# Patient Record
Sex: MALE | Race: WHITE | Employment: PART TIME | URBAN - METROPOLITAN AREA
[De-identification: names, ages, dates, MRNs, and addresses within clinical notes are randomized per-mention and may not be internally consistent; named-entity substitution may affect disease eponyms.]

---
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
## 2021-03-11 NOTE — PROGRESS NOTES
Megha Feliciano   2000, 24 y.o. male   <[id number]>       Referring Provider: Desi Martinez MD  Referral Type: In an order in [address] [place] [address]    Reason for Visit: Evaluation of suspected change in hearing, tinnitus, or balance. ADULT AUDIOLOGIC EVALUATION      Megha Feliciano is a 24 y.o. male seen today, 3/17/2021 for an initial audiologic evaluation. 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 Megha Feliciano denied otalgia, aural fullness, otorrhea, significant dizziness, history of head trauma, history of ear surgery, and family history of hearing loss. IMPRESSIONS:       Today's results are consistent with hearing sensitivity within normal limits with normal middle ear function and excellent word recognition for soft conversational speech bilaterally. Discussed tinnitus management strategies; follow medical recommendations from Dr. Rich Neff. ASSESSMENT AND FINDINGS:       Otoscopy revealed: Clear ear canals bilaterally      RIGHT EAR:  Hearing Sensitivity: Within normal limits. Speech Recognition Threshold: 5 dB HL  Word Recognition: Excellent (100%), based on NU-6 25-word list at 45 dBHL using recorded speech stimuli. Tympanometry: Normal peak pressure and compliance, Type A tympanogram, consistent with normal middle ear function. LEFT EAR:  Hearing Sensitivity: Within normal limits. Speech Recognition Threshold: 5 dB HL  Word Recognition: Excellent (100%), based on NU-6 25-word list at 45 dBHL using recorded speech stimuli. Tympanometry: Normal peak pressure and compliance, Type A tympanogram, consistent with normal middle ear function. Reliability: Good  Transducer:  Inserts    See scanned audiogram dated 3/17/2021 for results. PATIENT EDUCATION:       The following items were discussed with the patient:    - Good Communication Strategies   - Tinnitus Management Strategies    - Noise-Induced Hearing Loss and use of Hearing Protection Devices (HPDs)    Educational information was shared in the After Visit Summary. RECOMMENDATIONS:                                                                                                                                                                                                                                                                      The following items are recommended based on patient report and results from today's appointment:   - Continue medical follow-up with Basil Phillips MD.   - Retest hearing as medically indicated and/or sooner if a change in hearing is noted. - Utilize \"Good Communication Strategies\" as discussed to assist in speech understanding with communication partners. - Maintain a sound enriched environment to assist in the management of tinnitus symptoms.      - Use hearing protection devices (HPDs), such as protective ear muffs and ear plugs, when exposed to dangerous sound levels. [facility] [place], [place]  Audiologist      Chart CC'd to:  Basil Phillips MD      Degree of   Hearing Sensitivity dB Range   Within Normal Limits (WNL) 0 - 20   Mild 20 - 40   Moderate 40 - 55   Moderately-Severe 55 - 70   Severe 70 - 90   Profound 90 +

## 2021-03-17 ENCOUNTER — PROCEDURE VISIT (OUTPATIENT)
Dept: AUDIOLOGY | Age: 21
End: 2021-03-17
Payer: COMMERCIAL

## 2021-03-17 ENCOUNTER — OFFICE VISIT (OUTPATIENT)
Dept: ENT CLINIC | Age: 21
End: 2021-03-17
Payer: COMMERCIAL

## 2021-03-17 VITALS
SYSTOLIC BLOOD PRESSURE: 121 MMHG | WEIGHT: 166.2 LBS | HEART RATE: 72 BPM | HEIGHT: 71 IN | BODY MASS INDEX: 23.27 KG/M2 | TEMPERATURE: 97.2 F | DIASTOLIC BLOOD PRESSURE: 69 MMHG

## 2021-03-17 DIAGNOSIS — Z01.10 ENCOUNTER FOR EXAMINATION OF EARS AND HEARING WITHOUT ABNORMAL FINDINGS: ICD-10-CM

## 2021-03-17 DIAGNOSIS — H93.13 TINNITUS, BILATERAL: Primary | ICD-10-CM

## 2021-03-17 PROCEDURE — 92567 TYMPANOMETRY: CPT | Performed by: AUDIOLOGIST

## 2021-03-17 PROCEDURE — 92552 PURE TONE AUDIOMETRY AIR: CPT | Performed by: AUDIOLOGIST

## 2021-03-17 PROCEDURE — G8484 FLU IMMUNIZE NO ADMIN: HCPCS | Performed by: OTOLARYNGOLOGY

## 2021-03-17 PROCEDURE — G8427 DOCREV CUR MEDS BY ELIG CLIN: HCPCS | Performed by: OTOLARYNGOLOGY

## 2021-03-17 PROCEDURE — 99203 OFFICE O/P NEW LOW 30 MIN: CPT | Performed by: OTOLARYNGOLOGY

## 2021-03-17 PROCEDURE — 92556 SPEECH AUDIOMETRY COMPLETE: CPT | Performed by: AUDIOLOGIST

## 2021-03-17 PROCEDURE — G8420 CALC BMI NORM PARAMETERS: HCPCS | Performed by: OTOLARYNGOLOGY

## 2021-03-17 NOTE — PATIENT INSTRUCTIONS
Tinnitus: Overview and Management Strategies          Many people have some ringing sounds in their ears once in a while. You may hear a roar, a hiss, a tinkle, or a buzz. The sound usually lasts only a few minutes. If it goes on all the time, you may have tinnitus. Tinnitus is usually caused by long-term exposure to loud noise. This damages the nerves in the inner ear. It can occur with all types of hearing loss. It may be a symptom of almost any ear problem. Tinnitus may be caused by a buildup of earwax. Or, it may be caused by ear infections or certain medicines (especially antibiotics or large amounts of aspirin). You can also hear noises in your ears because of an injury to the ears, drinking too much alcohol or caffeine, or a medical condition. Other conditions may also contribute to tinnitus, including: head and neck trauma, temporomandibular joint disorder (TMJ), sinus pressure and barometric trauma, traumatic brain injury, metabolic disorders, autoimmune disorders, stress, and high blood pressure. You may need tests to evaluate your hearing and to find causes of long-lasting tinnitus. Your doctor may suggest one or more treatments to help you cope with the tinnitus. You can also do things at home to help reduce symptoms. Follow-up care is a key part of your treatment and safety. Be sure to make and go to all appointments, and call your doctor if you are having problems. It's also a good idea to know your test results and keep a list of the medicines you take. How can you care for yourself at home? · Limit or cut out alcohol, caffeine, and sodium. They can make your symptoms worse. · Do not smoke or use other tobacco products. Nicotine reduces blood flow to the ear and makes tinnitus worse. If you need help quitting, talk to your doctor about stop-smoking programs and medicines. These can increase your chances of quitting for good.   · Talk to your doctor about whether to stop taking aspirin and similar products such as ibuprofen or naproxen. · Get exercise often. It can help improve blood flow to the ear. Ways to manage/cope with tinnitus  Some tinnitus may last a long time. To manage your tinnitus, try to:  · Avoid noises that you think caused your tinnitus. If you can't avoid loud noises, wear earplugs or earmuffs. · Ignore the sound by paying attention to other things. Keeping your brain busy with other tasks or background noise can help your brain not focus on the tinnitus. · Try to not give the tinnitus an emotional reaction. Do your best to ignore the sound and not let it bother you. Relax using biofeedback, meditation, or yoga. Feeling stressed and being tired can make tinnitus worse. · Play music or white noise to help you sleep. Background noise may cover up the noise that you hear in your ears. You can buy a tabletop machine or a device that sits under your pillow to play soothing sounds, like ocean waves. · Smart phones have free apps, such as Whist, Relax Melodies, ReSound Relief, and White Noise Lite. These apps have different types of sounds/noise, some of which you can blend together to find sounds that are most soothing to you. · Hearing aid technology, especially when there is some hearing loss, may help reduce tinnitus symptoms by giving your brain better access to the sounds it is missing. There are some hearing aids with built-in noise generator programs, which may help when amplification alone is not enough. Additional resources may be found through the American Tinnitus Association at www.rayray.org    When should you call for help? Call 911 anytime you think you may need emergency care. For example, call if:    · You have symptoms of a stroke. These may include:  ? Sudden numbness, tingling, weakness, or loss of movement in your face, arm, or leg, especially on only one side of your body. ? Sudden vision changes. ? Sudden trouble speaking.   ? Sudden confusion or trouble understanding simple statements. ? Sudden problems with walking or balance. ? A sudden, severe headache that is different from past headaches. Call your doctor now or seek immediate medical care if:    · You develop other symptoms. These may include hearing loss (or worse hearing loss), balance problems, dizziness, nausea, or vomiting. Watch closely for changes in your health, and be sure to contact your doctor if:    · Your tinnitus moves from both ears to one ear. · Your hearing loss gets worse within 1 day after an ear injury. · Your tinnitus or hearing loss does not get better within 1 week after an ear injury. · Your tinnitus bothers you enough that you want to take medicines to help you cope with it. If you notice changes in your tinnitus and/or your hearing, it is recommended that you have your hearing tested by your audiologist and to follow-up with your physician that manages your hearing loss (such as your ENT or Primary Care doctor). Good Communication Strategies    Communication can be challenging for anyone, but can be especially difficult for those with some degree of hearing loss. While we may not be able to control every factor that may lead to difficulty with communication, there are Good Communication Strategies that we can all use in our day-to-day lives. Communication takes both parties working together for it to be successful. Tips as a Listener:   1. Control your environment. It is important to limit the amount of background noise in the room when possible. You should also consider having a good light source in the room to best see the other person. 2. Ask for clarification. Instead of saying \"What?\", you can use parts of what you heard to make a new question. For example, if you heard the word \"Thursday\" but not the rest of the week, you may ask \"What was that about Thursday? \" or \"What did you want to do Thursday? \".   This shows the person talking that you are listening and will help them better explain what they are saying. 3. Be an advocate for yourself. If you are hearing but not understanding, tell the other person \"I can hear you, but I need you to slow down when you speak. \"  Or if someone is facing the other direction, say \"I cannot hear you when you are not looking at me when we talk. \"       Tips as a Talker:   - Sit or stand 3 to 6 feet away to maximize audibility         -- It is unrealistic to believe someone else will fully hear your message if you are speaking from across the room or in a different room in the house   - Stay at eye level to help with visual cues   - Make sure you have the persons attention before speaking   - Use facial expressions and gestures to accentuate your message   - Raise your voice slightly (do not scream)   - Speak slowly and distinctly   - Use short, simple sentences   - Rephrase your words if the person is having a hard time understanding you    - To avoid distortion, dont speak directly into a persons ear      Some additional items that may be helpful:   - Remain patient - this is important for both parties   - Write down items that still cannot be heard/understood. You may write with pen/paper or consider typing/texting on a cell phone or smart device. - If background noise is unavoidable, try to keep yourself in a good position in the room. By sitting at a vidales on the side of the restaurant (preferably a corner), it will be easier to communicate than if you were sitting at a table in the middle with background noise surrounding you. Keep yourself positioned away from music speakers or heavy foot traffic.   - If you have difficulty with the television, consider these options:      -- Use closed-captioning, which is a setting you can turn on that displays the spoken words in a written form on the screen. There may be a slight delay, but this can help fill in missing information.   This can be especially helpful when watching programs with accented speech. -- Consider use of a sound bar or speakers that come from the front of the TV. With modern flat screen TVs, many of them have speakers that come out of the back of the device, which makes sound bounce off the wall behind it, then go into the room. Sound bars can allow the sound to go straight in your direction and can improve sound quality. -- Consider ear level devices to help improve the volume and/or sound quality of the program.  There are devices that work like headphones that you can adjust the volume for your ears while others can have the volume at a more comfortable level, such as \"TV Ears\". Most hearing aids have devices that allow them to connect directly to the TV and improve sound quality. Noise-Induced Hearing Loss  What it is, and what you can do to prevent it    Exposure to loud sounds, in an occupational setting or recreational, can cause permanent hearing loss. Sound is measured in decibels (dB). Noise-induced hearing loss is the ONLY type of preventable hearing loss. Hearing loss related to noise exposure can occur at any age. There are small sensory cells, called inner and outer hair cells, within the inner ear (cochlea). These cells process the loudness (intensity) and pitch (frequency) of sound and send the signal to the brain via our auditory nerve (vestibulocochlear nerve, cranial nerve VIII). When these cells are damaged, they can result in permanent hearing loss and/or tinnitus. The hair cells responsible for high frequency sounds, like birds chirping, are most likely to be damaged due to loud sounds. The high frequency sounds are also very important for our clarity and understanding of speech. OCCUPATIONAL NOISE EXPOSURE RECREATIONAL NOISE EXPOSURE   Some jobs may have exposure to loud sounds in the workplace.   These jobs may include but are not limited to:  [employer] settings   Manufacturing   Construction   Welding   Landscaping   Hairdressing/hairstyling   Musicians  [employer]    ... And more! Many activities outside of work may cause permanent hearing loss. These activities may include but are not limited to:  Lawnmowers, leaf blowers  [employer] (such as pigs squealing)   Chainsaws and other power tools  [employer] musical instruments and/or singing   Listening to music too loudly - at concerts, through stereo, through ear buds or headphones   Attending sporting events   Attending fireworks shows or using fireworks at home  [employer] of firearms   . .. And more! REDUCE OR PROTECT YOUR EARS FROM NOISE EXPOSURE    To do your best to avoid noise-induced hearing loss, here are some tips:   Limit exposure to loud sounds. 85 dB (decibels) is safe for 8 hours. As sounds are louder, the length of time the sound is safe lessens. These numbers are cumulative across a 24-hour period. (NIOSH and CDC, 2002)  o 85 dB is safe for 8 hours  o 88 dB is safe for 4 hours  o 91 dB is safe for 2 hours  o 94 dB is safe for 1 hour  o 97 dB is safe for 30 minutes  o 100 dB is safe for 15 minutes  o 103 dB is safe for 7.5 minutes  o 106 dB is safe for 3.75 minutes  o 109 dB is safe for LESS THAN 2 minutes  o 112 dB is safe for LESS THAN 1 minute  o 115 dB is safe for ~ 30 seconds  o 130 dB can cause IMMEDIATE hearing loss   If you are unsure if a sound is too loud, consider checking the sound level with a \"sound level meter\". There are apps on smart devices, such as \"Decibel X\", that can measure the loudness of the sound. They are not as accurate as expensive equipment used by scientists, but it will give you a guesstimate of how loud the sound is, and if it may be damaging to your hearing.  If you cannot avoid loud sounds, here are ways to reduce your exposure:  o 1.  Wear hearing protection  - Ear plugs and protective ear muffs can be used to reduce the intensity of the sound. The higher the NRR (noise reduction rating), the better reduction of the intensity of the sound   o 2. Turn the volume down  - When listening to music, turn the volume down, especially when wearing ear buds or headphones. A good rule of thumb is to not go beyond the middle setting on your device. If you can't hear someone talking to you from arm's length away, your music may be at a level that it can cause damage. If someone else can hear your music from 3 feet away, it may also be at a level that it can cause damage. o 3. Walk away from the sound  - If you do not have the ability to wear hearing protection or turn down the volume of the sound, you should do your best to move away from the source of the sound. - Sound decreases in intensity as we move further from the source. The sound will decrease by 6 dB for every doubling of distance from the sound source. TYPES OF HEARING PROTECTION    The most common types of hearing protection are protective ear muffs and ear plugs. Protective ear muffs are commonly found at home improvement or sporting good stores, they can be worn time and time again and are great if you need to take your hearing protection off frequently. Ear plugs are often made of foam or soft silicone. The foam ones are designed for one-time use, while silicone ear plugs may be used multiple times. There are also \"filtered\" ear plugs that help provide even attenuation of the sound across all frequencies. These are great for listening to music or going to concerts, and allow for better understanding of speech in louder environments. They can be purchased at music stores or online retailers (search \"Ety Plugs\" or \"filtered ear plugs\"), or custom earmolds can be made with an audiologist.    There are \"custom\" hearing protection devices that you can further discuss with your audiologist based on your specific needs, if desired.         Exposure to these sounds may cause permanent damage to your hearing.   If you suspect your hearing has changed, it is recommended that you have your hearing tested by your audiologist. Yes

## 2021-03-17 NOTE — Clinical Note
Dr. Octavia Colorado,    Please see note from this patient's audiogram from today. Please let me know if there is anything further you need.       Juana Koehler [id number] [place]  Audiologist

## 2021-03-17 NOTE — PROGRESS NOTES
CHIEF COMPLAINT: Tinnitus both ears    HISTORY OF PRESENT ILLNESS:  24 y.o. male referred by Nurse Vannie Fleischer who presents with tinnitus both ears of 2-3 weeks duration. Sudden onset. Comes and goes. High pitch, non pulsatile. Hearing is subjectively good. Uses ear protection when he is exposed to loud noise. No family history of hearing loss. PAST MEDICAL HISTORY:   Social History     Tobacco Use   Smoking Status Never Smoker   Smokeless Tobacco Never Used                                                    Social History     Substance and Sexual Activity   Alcohol Use Never    Frequency: Never                                                  No current outpatient medications on file. Past Medical History:   Diagnosis Date    Tinnitus                                                   History reviewed. No pertinent surgical history. FAMILY HISTORY: Family history reviewed. Except as noted in history of present illness, there is no pertinent family history      REVIEW OF SYSTEMS:  All pertinent positive and negative review of systems included in HPI. Otherwise, all systems are reviewed and negative. PHYSICAL EXAMINATION:   GENERAL: wdwn- no acute distress  RESPIRATORY:  No stridor or respiratory distress  COMMUNICATION :  Normal voice  MENTAL STATUS:  Mood and affect normal, oriented X 3  HEAD AND FACE:  No abnormalities of the skin of face or head  EXTERNAL EARS AND NOSE:  Normal pinnae bilateral  FACIAL MUSCLES:  All branches of facial nerve intact  EXTRAOCULAR MUSCLES: Intact with full range of motion  FACE PALPATION:  No tenderness over sinuses. Zygomatic arches and orbital rims intact  OTOSCOPY:  Normal external auditory canals, tympanic membranes, and middle ear spaces  TUNING FORKS: Rinne ++ Vasquez midline at 512 Hz  INTRANASAL:  Septum midline, turbinates normal, meati clear.   LIPS, TEETH, GINGIVA:  Normal mucosa  PHARYNX:  Normal  NECK:  No masses. LYMPHATIC:  No cervical adenopathy  SALIVARY GLANDS:  No swelling or masses in the parotid or submandibular salivary glands  THYROID:  No goiter or thyroid masses. AUDIOGRAM & TYMPANOGRAM ORDERED AND REVIEWED: Excellent symmetrical hearing. IMPRESSION: Bilateral nonpulsatile tinnitus in the face of normal otoscopic and audiologic findings. PLAN: Patient advised of normal findings. Tinnitus is of recent onset. Will observe to see if it resolves over the next 2 to 3 weeks. FOLLOW-UP: By phone in 2 to 3 weeks.

## 2021-10-05 ENCOUNTER — OFFICE VISIT (OUTPATIENT)
Dept: FAMILY MEDICINE CLINIC | Age: 21
End: 2021-10-05
Payer: COMMERCIAL

## 2021-10-05 VITALS
HEIGHT: 69 IN | WEIGHT: 165.4 LBS | DIASTOLIC BLOOD PRESSURE: 76 MMHG | HEART RATE: 91 BPM | TEMPERATURE: 97.3 F | BODY MASS INDEX: 24.5 KG/M2 | RESPIRATION RATE: 12 BRPM | OXYGEN SATURATION: 99 % | SYSTOLIC BLOOD PRESSURE: 118 MMHG

## 2021-10-05 DIAGNOSIS — J98.8 RESPIRATORY TRACT INFECTION: Primary | ICD-10-CM

## 2021-10-05 DIAGNOSIS — Z00.00 ENCOUNTER FOR WELL ADULT EXAM WITHOUT ABNORMAL FINDINGS: ICD-10-CM

## 2021-10-05 PROCEDURE — 99385 PREV VISIT NEW AGE 18-39: CPT | Performed by: FAMILY MEDICINE

## 2021-10-05 PROCEDURE — G8484 FLU IMMUNIZE NO ADMIN: HCPCS | Performed by: FAMILY MEDICINE

## 2021-10-05 RX ORDER — BENZONATATE 200 MG/1
200 CAPSULE ORAL 3 TIMES DAILY PRN
Qty: 15 CAPSULE | Refills: 0 | Status: SHIPPED | OUTPATIENT
Start: 2021-10-05 | End: 2021-10-20

## 2021-10-05 RX ORDER — AZITHROMYCIN 250 MG/1
250 TABLET, FILM COATED ORAL SEE ADMIN INSTRUCTIONS
Qty: 6 TABLET | Refills: 0 | Status: SHIPPED | OUTPATIENT
Start: 2021-10-05 | End: 2021-10-10

## 2021-10-05 SDOH — ECONOMIC STABILITY: FOOD INSECURITY: WITHIN THE PAST 12 MONTHS, THE FOOD YOU BOUGHT JUST DIDN'T LAST AND YOU DIDN'T HAVE MONEY TO GET MORE.: NEVER TRUE

## 2021-10-05 SDOH — ECONOMIC STABILITY: FOOD INSECURITY: WITHIN THE PAST 12 MONTHS, YOU WORRIED THAT YOUR FOOD WOULD RUN OUT BEFORE YOU GOT MONEY TO BUY MORE.: NEVER TRUE

## 2021-10-05 ASSESSMENT — PATIENT HEALTH QUESTIONNAIRE - PHQ9
SUM OF ALL RESPONSES TO PHQ QUESTIONS 1-9: 0
1. LITTLE INTEREST OR PLEASURE IN DOING THINGS: 0
SUM OF ALL RESPONSES TO PHQ QUESTIONS 1-9: 0
SUM OF ALL RESPONSES TO PHQ9 QUESTIONS 1 & 2: 0
2. FEELING DOWN, DEPRESSED OR HOPELESS: 0
SUM OF ALL RESPONSES TO PHQ QUESTIONS 1-9: 0

## 2021-10-05 ASSESSMENT — SOCIAL DETERMINANTS OF HEALTH (SDOH): HOW HARD IS IT FOR YOU TO PAY FOR THE VERY BASICS LIKE FOOD, HOUSING, MEDICAL CARE, AND HEATING?: NOT HARD AT ALL

## 2021-10-05 NOTE — PROGRESS NOTES
[facility]  Progress Note  Elvia Ramírez DO          Rachid Israel  2000    10/06/21    Chief Complaint:   Rachid Israel is a 24 y.o. male who is here for to establish an upper respiratory infection        HPI:     Is established in [facility] system through a telehealth appointment in one of the neighboring clinics. His father is a patient of mine and requested that his son get seen and established through this clinic since his father is a patient of mine. slight coughing, no breathing problems, 2 weeks ago, no covid test.      ROS negative for headache, visionchanges, chest pain, shortness of breath, abdominal pain, urinary sx, bowel changes. Psychosocial Interview Questions for Young People:  Home and Environment: work part time . [employer]  Activities (Hobbies): Enjoys [employer]      Past medical, surgical, and social history reviewed. and allergies reviewed. No Known Allergies  Prior to Visit Medications    Medication Sig Taking? Authorizing Provider   benzonatate (TESSALON) 200 MG capsule Take 1 capsule by mouth 3 times daily as needed for Cough Yes Amadou Ang DO   azithromycin (ZITHROMAX) 250 MG tablet Take 1 tablet by mouth See Admin Instructions for 5 days 500mg on day 1 followed by 250mg on days 2 - 5 Yes Amadou nAg DO          Vitals:    10/05/21 1551   BP: 118/76   Pulse: 91   Resp: 12   Temp: 97.3 °F (36.3 °C)   TempSrc: Temporal   SpO2: 99%   Weight: 165 lb 6.4 oz (75 kg)   Height: 5' 8.5\" (1.74 m)      Wt Readings from Last 3 Encounters:   10/05/21 165 lb 6.4 oz (75 kg) (65 %, Z= 0.39)*   03/17/21 166 lb 3.2 oz (75.4 kg) (69 %, Z= 0.49)*     * Growth percentiles are based on CDC (Boys, 2-20 Years) data.      BP Readings from Last 3 Encounters:   10/05/21 118/76   03/17/21 121/69       Patient Active Problem List   Diagnosis   (none) - all problems resolved or deleted       Immunization History   Administered Date(s) Administered    DTaP vaccine 02/23/2002, 05/04/2002, 08/10/2002, 07/17/2003, 02/13/2007    HPV 9-valent Aziza Lewis) 06/30/2016, 09/21/2016    Hepatitis A Ped/Adol (Havrix, Vaqta) 04/15/2014    Hepatitis B Ped/Adol (Engerix-B, Recombivax HB) 01/28/2002, 10/05/2002    Hib (HbOC) 02/23/2002, 05/04/2002, 08/10/2002, 05/30/2003    Influenza, [place], IM, PF (6 mo and older Fluzone, Flulaval, Fluarix, and 3 yrs and older Afluria) 11/11/2014, 11/01/2019    MMR 01/11/2003, 02/13/2007    Meningococcal B, Recombinant Donia Lather) 08/23/2018, 11/01/2019    Meningococcal MCV4P (Menactra) 04/15/2014, 08/23/2018    Pneumococcal Conjugate 7-valent (Virgin Pollard) 03/23/2002, 08/10/2002, 10/05/2002, 05/30/2003    Polio IPV (IPOL) 03/23/2002, 06/08/2002, 07/17/2003, 02/13/2007    Tdap (Boostrix, Adacel) 04/15/2014    Varicella (Varivax) 01/11/2003       Past Medical History:   Diagnosis Date    Tinnitus      No past surgical history on file.   Family History   Problem Relation Age of Onset    No Known Problems Mother     No Known Problems Father     Stroke Paternal Grandmother      Social History     Socioeconomic History    Marital status: Single     Spouse name: Not on file    Number of children: Not on file    Years of education: Not on file    Highest education level: Not on file   Occupational History    Not on file   Tobacco Use    Smoking status: Never Smoker    Smokeless tobacco: Never Used   Vaping Use    Vaping Use: Never used   Substance and Sexual Activity    Alcohol use: Never    Drug use: Never    Sexual activity: Not on file   Other Topics Concern    Not on file   Social History Narrative    Not on file     Social Determinants of Health     Financial Resource Strain: Low Risk     Difficulty of Paying Living Expenses: Not hard at all   Food Insecurity: No Food Insecurity    Worried About [address] in the Last Year: Never true    [address] in the Last Year: Never true Transportation Needs:     Lack of Transportation (Medical):  Lack of Transportation (Non-Medical):    Physical Activity:     Days of Exercise per Week:     Minutes of Exercise per Session:    Stress:     Feeling of Stress :    Social Connections:     Frequency of Communication with Friends and Family:     Frequency of Social Gatherings with Friends and Family:     Attends Christian Services:     Active Member of Clubs or Organizations:     Attends Club or Organization Meetings:     Marital Status:    Intimate Partner Violence:     Fear of Current or Ex-Partner:     Emotionally Abused:     Physically Abused:     Sexually Abused:        O: /76   Pulse 91   Temp 97.3 °F (36.3 °C) (Temporal)   Resp 12   Ht 5' 8.5\" (1.74 m)   Wt 165 lb 6.4 oz (75 kg)   SpO2 99%   BMI 24.78 kg/m²   Physical Exam  GEN: No acute distress,cooperative, well nourished, alert. HEENT: PEERLA, EOMI , normocephalic/atraumatic, external nose appears normal.  External ear is normal.    Neck: soft, supple, no appreciable thyromegaly,mass  CV: No upper extremity edema. Resp:  Breathing comfortably. Psych:normal affect. Neuro: AOx3  Other Pertinent Physical Exam findings:   Heart: Normal S1 and S2 with regular rhythm. Lungs: Clear to auscultation bilaterally. ASSESSMENT   Diagnosis Orders   1. Respiratory tract infection  benzonatate (TESSALON) 200 MG capsule    azithromycin (ZITHROMAX) 250 MG tablet       3 weeks of #1. Provide medication above. Call our office if no significant improvement in 1 week. Discussion encouraging Covid vaccination and time spent comparing the different vaccinations available [facility].       PLAN          Time spent on encounter (including any number of the following: review of labs, imaging, provider notes, outside hospital records; performing examination/evaluation; counseling patient and family; ordering medications/tests; placing referrals and communication with referring physicians; coordination of care, and documentation in the EHR): 30 minutes  Established E/M: 10-19 (58560), 20-29 (35421), 30-39 (51816), 40-54 (24993)   New E/M: 15-29 (39728), 30-44 (34525), 45-59 (95835), 60-74 (53837)  Telephone E/M: 5-10 (62575), 11-20 (98172), 21-30 (60541)    If applicable, see additional patient information and instructions under \"Patient Instructions. \"    No follow-ups on file. Patient Instructions   Go to vaccines. gov to find the covid vaccine location convenient for you. Please note a portion of this chart was generated using dragon dictation software. Although every effort was made to ensure the accuracy of this automated transcription,some errors in transcription may have occurred.

## 2022-01-10 ENCOUNTER — HOSPITAL ENCOUNTER (EMERGENCY)
Age: 22
Discharge: HOME | End: 2022-01-10
Attending: STUDENT IN AN ORGANIZED HEALTH CARE EDUCATION/TRAINING PROGRAM
Payer: COMMERCIAL

## 2022-01-10 ENCOUNTER — APPOINTMENT (OUTPATIENT)
Dept: GENERAL RADIOLOGY | Age: 22
End: 2022-01-10
Payer: COMMERCIAL

## 2022-01-10 VITALS
SYSTOLIC BLOOD PRESSURE: 140 MMHG | RESPIRATION RATE: 19 BRPM | DIASTOLIC BLOOD PRESSURE: 89 MMHG | TEMPERATURE: 99 F | OXYGEN SATURATION: 100 % | HEART RATE: 92 BPM

## 2022-01-10 DIAGNOSIS — S69.91XA INJURY OF FINGER OF RIGHT HAND, INITIAL ENCOUNTER: Primary | ICD-10-CM

## 2022-01-10 PROCEDURE — 12001 RPR S/N/AX/GEN/TRNK 2.5CM/<: CPT

## 2022-01-10 PROCEDURE — 73130 X-RAY EXAM OF HAND: CPT

## 2022-01-10 PROCEDURE — 2500000003 HC RX 250 WO HCPCS: Performed by: PHYSICIAN ASSISTANT

## 2022-01-10 PROCEDURE — 99283 EMERGENCY DEPT VISIT LOW MDM: CPT

## 2022-01-10 RX ORDER — CEPHALEXIN 500 MG/1
500 CAPSULE ORAL 4 TIMES DAILY
Qty: 20 CAPSULE | Refills: 0 | Status: SHIPPED | OUTPATIENT
Start: 2022-01-10 | End: 2022-01-15

## 2022-01-10 RX ADMIN — LIDOCAINE HYDROCHLORIDE 5 ML: 10 INJECTION, SOLUTION EPIDURAL; INFILTRATION; INTRACAUDAL; PERINEURAL at 22:05

## 2022-01-10 ASSESSMENT — PAIN SCALES - GENERAL: PAINLEVEL_OUTOF10: 3

## 2022-01-10 NOTE — Clinical Note
Matthias Walker was seen and treated in our emergency department on 1/10/2022. He may return to school on 01/11/2022. If you have any questions or concerns, please don't hesitate to call.       BRAD Walden

## 2022-01-11 ASSESSMENT — ENCOUNTER SYMPTOMS: SORE THROAT: 0

## 2022-01-11 NOTE — ED PROVIDER NOTES
[address] ENCOUNTER          PHYSICIAN ASSISTANT NOTE       Date of evaluation: 1/10/2022    Chief Complaint     Nail Problem (pt states, \"My fingernail is completely removed. I was at work and mashed and ripped my fingernail off. \")      History of Present Illness     HPI: Julia Rose is a 22 y.o. male with no pertinent past medical history who presents to the emergency department with right finger injury. Patient was working with a machine at work when a component of it smashed his finger. This is metal.  Patient is right-hand dominant. He is primarily experiencing pain at the end of his fingertip and in his nailbed. He does have some bleeding underlying the proximal aspect of his nail bed. Patient's tetanus was last updated 8 years ago. He denies sustaining any other injuries. He denies any difficulty moving his finger. With the exception of the above, there are no aggravating or alleviating factors. Review of Systems     Review of Systems   Constitutional: Negative for chills and fever. HENT: Negative for congestion and sore throat. Cardiovascular: Negative for chest pain. Skin: Positive for wound. Neurological: Negative for headaches. As stated above, all other systems reviewed and are otherwise negative. Past Medical, Surgical, Family, and Social History     He has a past medical history of Tinnitus. He has no past surgical history on file. His family history includes No Known Problems in his father and mother; Stroke in his paternal grandmother. He reports that he has never smoked. He has never used smokeless tobacco. He reports that he does not drink alcohol and does not use drugs. Medications     Discharge Medication List as of 1/10/2022 10:24 PM          Allergies     He has No Known Allergies.     Physical Exam     INITIAL VITALS: BP: (!) 140/89, Temp: 99 °F (37.2 °C), Pulse: 92, Resp: 19, SpO2: 100 %  Physical Exam  Vitals and nursing note reviewed. Constitutional:       General: He is not in acute distress. Appearance: Normal appearance. He is normal weight. He is not ill-appearing, toxic-appearing or diaphoretic. HENT:      Head: Normocephalic and atraumatic. Right Ear: External ear normal.      Left Ear: External ear normal.      Nose: Nose normal.      Mouth/Throat:      Mouth: Mucous membranes are moist.      Pharynx: Oropharynx is clear. Eyes:      Extraocular Movements: Extraocular movements intact. Conjunctiva/sclera: Conjunctivae normal.   Cardiovascular:      Rate and Rhythm: Normal rate. Pulses: Normal pulses. Pulmonary:      Effort: Pulmonary effort is normal. No respiratory distress. Musculoskeletal:         General: Normal range of motion. Cervical back: Normal range of motion. Comments: Full range of motion of metacarpal, PIP and DIP of right second digit. Skin:     General: Skin is warm and dry. Comments: Superficial abrasion/laceration following the proximal nailbed of the right second digit without any gaping or underlying visible deeper structures. Visible's proximal subungual hematoma, approximately 40% of right second nailbed. Neurological:      General: No focal deficit present. Mental Status: He is alert. Mental status is at baseline. Psychiatric:         Mood and Affect: Mood normal.         Behavior: Behavior normal.         Diagnostic Results     RADIOLOGY:  XR HAND RIGHT (MIN 3 VIEWS)   Final Result      1. No acute abnormality. LABS:   No results found for this visit on 01/10/22.     RECENT VITALS:  BP: (!) 140/89, Temp: 99 °F (37.2 °C), Pulse: 92, Resp: 19, SpO2: 100 %     Procedures     Lac Repair    Date/Time: 1/11/2022 12:26 AM  Performed by: BRAD Foley  Authorized by: Guille Overton MD     Consent:     Consent obtained:  Verbal    Consent given by:  Patient    Risks discussed:  Infection and need for additional repair Alternatives discussed:  No treatment  Anesthesia (see MAR for exact dosages): Anesthesia method:  Nerve block    Block location:  R second digit    Block needle gauge:  25 G    Block anesthetic:  Lidocaine 1% w/o epi    Block injection procedure:  Anatomic landmarks identified    Block outcome:  Anesthesia achieved  Laceration details:     Location:  Finger    Finger location:  R index finger  Repair type:     Repair type:  Simple  Pre-procedure details:     Preparation:  Patient was prepped and draped in usual sterile fashion  Exploration:     Wound exploration: wound explored through full range of motion and entire depth of wound probed and visualized    Treatment:     Area cleansed with:  Saline    Amount of cleaning:  Standard    Irrigation solution:  Sterile saline    Irrigation method:  Syringe    Visualized foreign bodies/material removed: no    Skin repair:     Repair method:  Tissue adhesive  Approximation:     Approximation:  Close  Post-procedure details:     Dressing:  Open (no dressing)    Patient tolerance of procedure: Tolerated well, no immediate complications        ED Course     Nursing Notes, Past Medical Hx,Past Surgical Hx, Social Hx, Allergies, and Family Hx were reviewed. The patient was given the following medications:  Orders Placed This Encounter   Medications    lidocaine 1 % injection 5 mL    cephALEXin (KEFLEX) 500 MG capsule     Sig: Take 1 capsule by mouth 4 times daily for 5 days     Dispense:  20 capsule     Refill:  0       CONSULTS:  None    MEDICAL DECISION MAKING / ASSESSMENT / PLAN     Vitals:    01/10/22 1846   BP: (!) 140/89   Pulse: 92   Resp: 19   Temp: 99 °F (37.2 °C)   TempSrc: Oral   SpO2: 100%       Bill Altman is a 22 y.o. male who presents to the emergency department with injury to right second digit. Patient had his finger smashed while at work between heavy machinery. He denies any difficulty moving his finger. His tetanus is up-to-date.   The patient has remained hemodynamically stable throughout their stay in the emergency department, and appears well overall. Given mechanism of injury and x-ray was obtained of his finger that does not show any acute osseous abnormalities. Given the extent of the patient's subungual hematoma we recommended that he have his nailbed removed, but the patient declined as he is concerned as how long it will take to regrow back. My attending had a lengthy discussion with the patient in regards to this potentially not going back secondary to the injury that he sustained. Digital block was performed and the patient's wound was thoroughly irrigated. On reassessment he does have some wounds that were closed with glue per documentation and procedure note above. I do not feel the patient requires any formal stitches given the nongaping nature of his wound. Patient will be preventively placed on Keflex. Both my attending and I attempted discussing removal of the nail with the patient but he is not interested at this time. We did advise him that this could happen while he is an outpatient. We discussed strict return precautions and close follow-up. Patient was provided with an AlumaFoam splint for comfort while he is still working. I do not believe that this patient requires any further work-up or inpatient management for their complaints, and are appropriate for outpatient follow-up. I discussed the findings of my physical exam and work-up with the patient, and they were given the opportunity to ask questions. The patient is agreeable with the plan and is ready to be discharged home. Patient instructed to follow-up with PCP as soon as possible, and given strict return precautions. The patient was evaluated by myself and the ED Attending Physician, Dr. Aubrey Motta. All management and disposition plans were discussed and agreed upon. Clinical Impression     1.  Injury of finger of right hand, initial encounter

## 2022-01-11 NOTE — ED PROVIDER NOTES
ED Attending Attestation Note     Date of evaluation: 1/10/2022    This patient was seen by the advanced practice provider. I have seen and examined the patient, agree with the workup, evaluation, management and diagnosis. The care plan has been discussed. My assessment reveals a man with a trauamtic nail injury today. He has some aching pain at the tip of his right second digit but it has improved while he was in the waiting room and is now mild in severity. There is some mild associated bleeding. On exam:    Right Hand  M/R/U tested in detail and intact, palpable radial/ulnar pulses  No bony deformity  Neurovascularly intact in all digits, including PIP flexion/extension and DIP flexion/extension in second digit in isolation. Normal ROM  AIN/PIN/IO intact  Digital cascade intact including second digit. No scissoring. TTP at distal phalanx second digit. Wounds - small laceration to radial dorsal aspect about 1-2 mm proximal from the nail, approx 2-3mm in length. Skin tear at ulnar aspect that is partially coalescent forming a semi-[place] that is hemostatic. No mobility on exam.   There is a subungual hematoma involving slightly less than half the nail. There is otherwise no erythema, warmth, or edema  Distal nail is intact with intact cap refill and sensation. There is no mobility of the soft tissue itself. I had an extended conversation with the patient in which I recommended that we remove the nail to evaluate the nailbed for underlying nailbed laceration. I identified for him the increased risk of infection if we did not thoroughly irrigate and closed any underlying nailbed laceration. The patient indicated clear understanding of this. I discussed that because of the area of his wound and my suspicion that that is where his primary trauma was, he may have injured at the germinal matrix. I advised him of the risk of nail nongrowth or dystrophia and he indicated understanding.   He declined to proceed with nailplate removal.         Manuela Montero MD  01/10/22 [id number]

## 2022-02-08 ENCOUNTER — OFFICE VISIT (OUTPATIENT)
Dept: FAMILY MEDICINE CLINIC | Age: 22
End: 2022-02-08

## 2022-02-08 VITALS
TEMPERATURE: 97.7 F | WEIGHT: 161.2 LBS | OXYGEN SATURATION: 98 % | DIASTOLIC BLOOD PRESSURE: 80 MMHG | BODY MASS INDEX: 24.15 KG/M2 | HEART RATE: 84 BPM | RESPIRATION RATE: 12 BRPM | SYSTOLIC BLOOD PRESSURE: 132 MMHG

## 2022-02-08 DIAGNOSIS — S67.10XA CRUSHING INJURY OF FINGER, INITIAL ENCOUNTER: ICD-10-CM

## 2022-02-08 DIAGNOSIS — M79.644 FINGER PAIN, RIGHT: Primary | ICD-10-CM

## 2022-02-08 PROCEDURE — 1111F DSCHRG MED/CURRENT MED MERGE: CPT | Performed by: FAMILY MEDICINE

## 2022-02-08 PROCEDURE — 99999 PR OFFICE/OUTPT VISIT,PROCEDURE ONLY: CPT | Performed by: FAMILY MEDICINE

## 2022-02-08 NOTE — PROGRESS NOTES
 Pneumococcal Conjugate 7-valent (Prevnar7) 03/23/2002, 08/10/2002, 10/05/2002, 05/30/2003    Polio IPV (IPOL) 03/23/2002, 06/08/2002, 07/17/2003, 02/13/2007    Tdap (Boostrix, Adacel) 04/15/2014    Varicella (Varivax) 01/11/2003       Past Medical History:   Diagnosis Date    Tinnitus      No past surgical history on file. Family History   Problem Relation Age of Onset    No Known Problems Mother     No Known Problems Father     Stroke Paternal Grandmother      Social History     Socioeconomic History    Marital status: Single     Spouse name: Not on file    Number of children: Not on file    Years of education: Not on file    Highest education level: Not on file   Occupational History    Not on file   Tobacco Use    Smoking status: Never Smoker    Smokeless tobacco: Never Used   Vaping Use    Vaping Use: Never used   Substance and Sexual Activity    Alcohol use: Never    Drug use: Never    Sexual activity: Not Currently     Partners: Female   Other Topics Concern    Not on file   Social History Narrative    Not on file     Social Determinants of Health     Financial Resource Strain: Low Risk     Difficulty of Paying Living Expenses: Not hard at all   Food Insecurity: No Food Insecurity    Worried About Running Out of Food in the Last Year: Never true    [address] in the Last Year: Never true   Transportation Needs:     Lack of Transportation (Medical): Not on file    Lack of Transportation (Non-Medical):  Not on file   Physical Activity:     Days of Exercise per Week: Not on file    Minutes of Exercise per Session: Not on file   Stress:     Feeling of Stress : Not on file   Social Connections:     Frequency of Communication with Friends and Family: Not on file    Frequency of Social Gatherings with Friends and Family: Not on file    Attends Hoahaoism Services: Not on file    Active Member of Clubs or Organizations: Not on file    Attends Club or Organization Meetings: Not on file    Marital Status: Not on file   Intimate Partner Violence:     Fear of Current or Ex-Partner: Not on file    Emotionally Abused: Not on file    Physically Abused: Not on file    Sexually Abused: Not on file   Housing Stability:     Unable to Pay for Housing in the Last Year: Not on file    Number of [place] in the Last Year: Not on file    Unstable Housing in the Last Year: Not on file       O: /80   Pulse 84   Temp 97.7 °F (36.5 °C) (Temporal)   Resp 12   Wt 161 lb 3.2 oz (73.1 kg)   SpO2 98%   BMI 24.15 kg/m²   Physical Exam  GEN: No acute distress,cooperative, well nourished, alert. HEENT: PEERLA, EOMI , normocephalic/atraumatic, external nose appears normal.  External ear is normal.    Neck: soft, supple, no appreciable thyromegaly,mass  CV: No upper extremity edema. Resp:  Breathing comfortably. Psych:normal affect. Neuro: AOx3  Other Pertinent Physical Exam findings: Marv Garrett demonstrates normal range of motion of the right index finger. The fingernail is present. Contusion is noted on the distal finger      ASSESSMENT   Diagnosis Orders   1. Finger pain, right     2. Crushing injury of finger, initial encounter         I was able to secure an appointment with Dr Isreal Gallego with Ortho since he in the [facility] office this afternoon. Patient was appreciative. PLAN      minimal decision making so I will not charge the patient.     Time spent on encounter (including any number of the following: review of labs, imaging, provider notes, outside hospital records; performing examination/evaluation; counseling patient and family; ordering medications/tests; placing referrals and communication with referring physicians; coordination of care, and documentation in the EHR): 12 minutes  Established E/M: 10-19 (66277), 20-29 (90280), 30-39 (64558), 40-54 (30281)   New E/M: 15-29 (40134), 30-44 (49030), 45-59 (24588), 60-74 (66152)  Telephone E/M: 5-10 (80063), 11-20 (56881), 21-30 (22095)    If applicable, see additional patient information and instructions under \"Patient Instructions. \"    No follow-ups on file. There are no Patient Instructions on file for this visit. Please note a portion of this chart was generated using dragon dictation software. Although every effort was made to ensure the accuracy of this automated transcription,some errors in transcription may have occurred.

## 2022-02-24 ENCOUNTER — TELEPHONE (OUTPATIENT)
Dept: FAMILY MEDICINE CLINIC | Age: 22
End: 2022-02-24

## 2022-02-24 ENCOUNTER — OFFICE VISIT (OUTPATIENT)
Dept: FAMILY MEDICINE CLINIC | Age: 22
End: 2022-02-24
Payer: COMMERCIAL

## 2022-02-24 VITALS
TEMPERATURE: 96.9 F | HEART RATE: 93 BPM | WEIGHT: 163 LBS | RESPIRATION RATE: 18 BRPM | DIASTOLIC BLOOD PRESSURE: 80 MMHG | BODY MASS INDEX: 24.42 KG/M2 | SYSTOLIC BLOOD PRESSURE: 132 MMHG | OXYGEN SATURATION: 99 %

## 2022-02-24 DIAGNOSIS — R39.15 URGENCY OF URINATION: Primary | ICD-10-CM

## 2022-02-24 DIAGNOSIS — N48.89 PENILE IRRITATION: Primary | ICD-10-CM

## 2022-02-24 DIAGNOSIS — N48.89 PENILE IRRITATION: ICD-10-CM

## 2022-02-24 DIAGNOSIS — R10.30 LOWER ABDOMINAL PAIN: ICD-10-CM

## 2022-02-24 DIAGNOSIS — R39.15 URGENCY OF URINATION: ICD-10-CM

## 2022-02-24 LAB
BILIRUBIN, POC: NEGATIVE
BLOOD URINE, POC: NEGATIVE
CLARITY, POC: CLEAR
COLOR, POC: NORMAL
GLUCOSE URINE, POC: NEGATIVE
KETONES, POC: NEGATIVE
LEUKOCYTE EST, POC: NEGATIVE
NITRITE, POC: NEGATIVE
PH, POC: 7
PROTEIN, POC: NEGATIVE
SPECIFIC GRAVITY, POC: 1.02
UROBILINOGEN, POC: 0.2

## 2022-02-24 PROCEDURE — 81002 URINALYSIS NONAUTO W/O SCOPE: CPT | Performed by: FAMILY MEDICINE

## 2022-02-24 PROCEDURE — 99213 OFFICE O/P EST LOW 20 MIN: CPT | Performed by: FAMILY MEDICINE

## 2022-02-24 NOTE — TELEPHONE ENCOUNTER
----- Message from Michael sent at 2/23/2022  4:37 PM EST -----  Subject: Referral Request    QUESTIONS   Reason for referral request? Requesting referral for urologist   Has the physician seen you for this condition before? No   Preferred Specialist (if applicable)? Do you already have an appointment scheduled? No  Additional Information for Provider?   ---------------------------------------------------------------------------  --------------  CALL BACK INFO  What is the best way for the office to contact you? OK to leave message on   voicemail  Preferred Call Back Phone Number?  [phone]

## 2022-03-01 NOTE — TELEPHONE ENCOUNTER
Referral is in for patient to meet with Dr. Dot Gibbs or one of his colleagues. Give him phone number to make appointment.   Jitendra Torres 37, [address]  Phone: [phone]  Fax: [phone]

## 2022-06-21 ENCOUNTER — TELEPHONE (OUTPATIENT)
Dept: FAMILY MEDICINE CLINIC | Age: 22
End: 2022-06-21

## 2022-06-21 NOTE — TELEPHONE ENCOUNTER
----- Message from Enid Munoz sent at 6/21/2022  2:02 PM EDT -----  Subject: Message to Provider    QUESTIONS  Information for Provider? Pt is requesting a call back with the date of   his last tetanus shot.  ---------------------------------------------------------------------------  --------------  CALL BACK INFO  What is the best way for the office to contact you? OK to leave message on   voicemail  Preferred Call Back Phone Number? [phone]  ---------------------------------------------------------------------------  --------------  SCRIPT ANSWERS  Relationship to Patient?  Self

## 2022-06-30 ENCOUNTER — NURSE TRIAGE (OUTPATIENT)
Dept: OTHER | Facility: CLINIC | Age: 22
End: 2022-06-30

## 2022-07-13 ENCOUNTER — NURSE TRIAGE (OUTPATIENT)
Dept: OTHER | Facility: CLINIC | Age: 22
End: 2022-07-13

## 2022-07-20 ENCOUNTER — OFFICE VISIT (OUTPATIENT)
Dept: INTERNAL MEDICINE CLINIC | Age: 22
End: 2022-07-20
Payer: COMMERCIAL

## 2022-07-20 VITALS
TEMPERATURE: 98.6 F | DIASTOLIC BLOOD PRESSURE: 80 MMHG | BODY MASS INDEX: 22.96 KG/M2 | OXYGEN SATURATION: 100 % | SYSTOLIC BLOOD PRESSURE: 134 MMHG | HEIGHT: 71 IN | WEIGHT: 164 LBS | HEART RATE: 104 BPM

## 2022-07-20 DIAGNOSIS — H83.03 LABYRINTHITIS OF BOTH EARS: ICD-10-CM

## 2022-07-20 DIAGNOSIS — J01.10 ACUTE NON-RECURRENT FRONTAL SINUSITIS: ICD-10-CM

## 2022-07-20 DIAGNOSIS — R42 DIZZINESS: Primary | ICD-10-CM

## 2022-07-20 PROCEDURE — 99203 OFFICE O/P NEW LOW 30 MIN: CPT | Performed by: INTERNAL MEDICINE

## 2022-07-20 PROCEDURE — 93000 ELECTROCARDIOGRAM COMPLETE: CPT | Performed by: INTERNAL MEDICINE

## 2022-07-20 RX ORDER — FLUTICASONE PROPIONATE 50 MCG
1 SPRAY, SUSPENSION (ML) NASAL 2 TIMES DAILY PRN
Qty: 32 G | Refills: 1 | Status: SHIPPED | OUTPATIENT
Start: 2022-07-20 | End: 2022-08-01 | Stop reason: SDUPTHER

## 2022-07-20 RX ORDER — AMOXICILLIN AND CLAVULANATE POTASSIUM 875; 125 MG/1; MG/1
1 TABLET, FILM COATED ORAL 2 TIMES DAILY
Qty: 20 TABLET | Refills: 0 | Status: SHIPPED | OUTPATIENT
Start: 2022-07-20 | End: 2022-07-30

## 2022-07-20 ASSESSMENT — ENCOUNTER SYMPTOMS
ABDOMINAL PAIN: 0
CHEST TIGHTNESS: 0
WHEEZING: 0
EYE DISCHARGE: 0
BLOOD IN STOOL: 0
SHORTNESS OF BREATH: 0
VOMITING: 0
SINUS PAIN: 1
NAUSEA: 0
RHINORRHEA: 0
COUGH: 0
TROUBLE SWALLOWING: 0
EYE PAIN: 0
SORE THROAT: 0
SINUS PRESSURE: 1

## 2022-07-20 ASSESSMENT — PATIENT HEALTH QUESTIONNAIRE - PHQ9
SUM OF ALL RESPONSES TO PHQ QUESTIONS 1-9: 0
SUM OF ALL RESPONSES TO PHQ QUESTIONS 1-9: 0
2. FEELING DOWN, DEPRESSED OR HOPELESS: 0
SUM OF ALL RESPONSES TO PHQ9 QUESTIONS 1 & 2: 0
1. LITTLE INTEREST OR PLEASURE IN DOING THINGS: 0
SUM OF ALL RESPONSES TO PHQ QUESTIONS 1-9: 0
SUM OF ALL RESPONSES TO PHQ QUESTIONS 1-9: 0

## 2022-07-20 NOTE — PROGRESS NOTES
2022    Fabiola Medina (: 2000) is a 22 y.o. male, here for evaluation of the following medical concerns:    Chief Complaint   Patient presents with    Establish Care     C/o dizziness x 3 weeks with nausea and headaches         Talking to friend --heat exhaustion--cutting grass--had not eaten or drank that day--1 pm--almost passed out--sat down    Headache--dizziness-nausea--episodes lasting 1 h--everyday except today---2-3 x  a day. 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 Rosalba Sinclair and so far no hearing loss but ate well and up and that and he needs to be working on managing other kind of job where there is not physical.  And not noisy. He is trying to pay off the school fee ahead of time and do KYLE explained to him that he should not take that much stress on himself to try to pay off school and maybe get some loans. Some chest pressure    Lot of water--1 coffee in am    Dizziness  This is a new problem. The current episode started 1 to 4 weeks ago. The problem occurs intermittently. Pertinent negatives include no abdominal pain, chest pain, chills, congestion, coughing, fever, headaches, myalgias, nausea, numbness, rash, sore throat, vomiting or weakness. Review of Systems   Constitutional:  Negative for appetite change, chills, fever and unexpected weight change. HENT:  Positive for sinus pressure and sinus pain. Negative for congestion, ear discharge, ear pain, nosebleeds, rhinorrhea, sore throat and trouble swallowing. Eyes:  Negative for pain and discharge. Respiratory:  Negative for cough, chest tightness, shortness of breath and wheezing. Cardiovascular:  Negative for chest pain, palpitations and leg swelling.    Gastrointestinal: Negative for abdominal pain, blood in stool, nausea and vomiting. Endocrine: Negative for polydipsia and polyphagia. Genitourinary:  Negative for difficulty urinating, enuresis, flank pain and hematuria. Musculoskeletal:  Negative for myalgias. Skin:  Negative for rash. Neurological:  Positive for dizziness. Negative for facial asymmetry, weakness, light-headedness, numbness and headaches. Psychiatric/Behavioral:  Negative for confusion. No current outpatient medications on file prior to visit. No current facility-administered medications on file prior to visit. No Known Allergies  Past Medical History:   Diagnosis Date    Dizziness 7/20/2022    Tinnitus      History reviewed. No pertinent surgical history. Social History     Tobacco Use    Smoking status: Never    Smokeless tobacco: Never   Substance Use Topics    Alcohol use: Never      Family History   Problem Relation Age of Onset    No Known Problems Mother     No Known Problems Father     Stroke Paternal Grandmother         Vitals:    07/20/22 1606 07/20/22 1610   BP: (!) 140/98 134/80   Site: Left Upper Arm Left Upper Arm   Position: Sitting Sitting   Cuff Size: Medium Adult Medium Adult   Pulse: (!) 104    Temp: 98.6 °F (37 °C)    TempSrc: Temporal    SpO2: 100%    Weight: 164 lb (74.4 kg)    Height: 5' 11\" (1.803 m)      Estimated body mass index is 22.87 kg/m² as calculated from the following:    Height as of this encounter: 5' 11\" (1.803 m). Weight as of this encounter: 164 lb (74.4 kg). Physical Exam  Vitals and nursing note reviewed. Constitutional:       General: He is not in acute distress. HENT:      Head: Normocephalic and atraumatic. Right Ear: Tympanic membrane, ear canal and external ear normal.      Left Ear: Tympanic membrane, ear canal and external ear normal.      Nose: Congestion present.       Comments: Frontal sinus tenderness dizziness with change of head position     Mouth/Throat:      Pharynx: Posterior oropharyngeal erythema present. Eyes:      General: Lids are normal.      Extraocular Movements: Extraocular movements intact. Conjunctiva/sclera: Conjunctivae normal.      Pupils: Pupils are equal, round, and reactive to light. Neck:      Thyroid: No thyromegaly. Vascular: No JVD. Trachea: No tracheal deviation. Cardiovascular:      Rate and Rhythm: Normal rate and regular rhythm. Heart sounds: Normal heart sounds. No gallop. Pulmonary:      Effort: Pulmonary effort is normal. No respiratory distress. Breath sounds: Normal breath sounds. No wheezing or rales. Abdominal:      General: Bowel sounds are normal.      Palpations: Abdomen is soft. There is no mass. Tenderness: There is no abdominal tenderness. Musculoskeletal:         General: No tenderness. Cervical back: Neck supple. Comments: No leg edema or calf tenderness   Lymphadenopathy:      Cervical: No cervical adenopathy. Skin:     General: Skin is warm and dry. Findings: No rash. Neurological:      General: No focal deficit present. Mental Status: He is alert and oriented to person, place, and time. Cranial Nerves: No cranial nerve deficit. Sensory: No sensory deficit. Psychiatric:         Mood and Affect: Mood normal.         Behavior: Behavior normal.         Thought Content: Thought content normal.         Judgment: Judgment normal.       ASSESSMENT/PLAN:  1. Dizziness    - EKG 12 Lead sinus rhythm RSR pattern probably normal for age  - CBC with Auto Differential; Future  - Comprehensive Metabolic Panel; Future  - T4, Free; Future  - TSH with Reflex; Future  - Urinalysis with Reflex to Culture; Future    2. Labyrinthitis of both ears    - amoxicillin-clavulanate (AUGMENTIN) 875-125 MG per tablet; Take 1 tablet by mouth in the morning and 1 tablet before bedtime. Do all this for 10 days. Dispense: 20 tablet; Refill: 0    3.  Acute non-recurrent frontal sinusitis    - fluticasone (FLONASE) 50 MCG/ACT nasal spray; 1 spray by Each Nostril route 2 times daily as needed for Rhinitis  Dispense: 32 g; Refill: 1    Return in about 12 days (around 8/1/2022) for dizziness. Patient Instructions   Augmentin 875 mg twice a day after food    Flonase 1 spray twice a day. Gatorade and make sure drinking 64 ounce water a day in addition. Make sure 3 meals a day    Yogurt /probiotic for 10 days. 1 cup of coffee in the morning only    Fruits. Change job    Discussed use, benefit, and side effects of prescribed medications. Barriers to compliance discussed. All patient questions answered. Pt voiced understanding. IF YOU NEED A PRESCRIPTION REFILL, THEN PLEASE GIVE US THREE WORKING DAYS TO REFILL A PRESCRIPTION. May go to urgent care or Emergency room or call to be seen in the office sooner than scheduled follow-up appointment,if condition worsens. Electronically signed by González Santana MD on 7/20/2022 at 5:26 PM     This dictation was generated by voice recognition computer software. Although all attempts are made to edit the dictation for accuracy, there may be errors in the transcription that are not intended.

## 2022-07-20 NOTE — PATIENT INSTRUCTIONS
Augmentin 875 mg twice a day after food    Flonase 1 spray twice a day. Gatorade and make sure drinking 64 ounce water a day in addition. Make sure 3 meals a day    Yogurt /probiotic for 10 days. 1 cup of coffee in the morning only    Fruits. Change job    Discussed use, benefit, and side effects of prescribed medications. Barriers to compliance discussed. All patient questions answered. Pt voiced understanding. IF YOU NEED A PRESCRIPTION REFILL, THEN PLEASE GIVE US THREE WORKING DAYS TO REFILL A PRESCRIPTION. May go to urgent care or Emergency room or call to be seen in the office sooner than scheduled follow-up appointment,if condition worsens.

## 2022-07-21 DIAGNOSIS — R42 DIZZINESS: ICD-10-CM

## 2022-07-21 LAB
A/G RATIO: 2 (ref 1.1–2.2)
ALBUMIN SERPL-MCNC: 4.9 G/DL (ref 3.4–5)
ALP BLD-CCNC: 67 U/L (ref 40–129)
ALT SERPL-CCNC: 14 U/L (ref 10–40)
ANION GAP SERPL CALCULATED.3IONS-SCNC: 11 MMOL/L (ref 3–16)
AST SERPL-CCNC: 18 U/L (ref 15–37)
BASOPHILS ABSOLUTE: 0 K/UL (ref 0–0.2)
BASOPHILS RELATIVE PERCENT: 0.6 %
BILIRUB SERPL-MCNC: 0.6 MG/DL (ref 0–1)
BILIRUBIN URINE: NEGATIVE
BLOOD, URINE: NEGATIVE
BUN BLDV-MCNC: 15 MG/DL (ref 7–20)
CALCIUM SERPL-MCNC: 9.7 MG/DL (ref 8.3–10.6)
CHLORIDE BLD-SCNC: 101 MMOL/L (ref 99–110)
CLARITY: CLEAR
CO2: 26 MMOL/L (ref 21–32)
COLOR: YELLOW
CREAT SERPL-MCNC: 0.8 MG/DL (ref 0.9–1.3)
EOSINOPHILS ABSOLUTE: 0.1 K/UL (ref 0–0.6)
EOSINOPHILS RELATIVE PERCENT: 3 %
GFR AFRICAN AMERICAN: >60
GFR NON-AFRICAN AMERICAN: >60
GLUCOSE BLD-MCNC: 68 MG/DL (ref 70–99)
GLUCOSE URINE: NEGATIVE MG/DL
HCT VFR BLD CALC: 41.5 % (ref 40.5–52.5)
HEMOGLOBIN: 13.9 G/DL (ref 13.5–17.5)
KETONES, URINE: ABNORMAL MG/DL
LEUKOCYTE ESTERASE, URINE: NEGATIVE
LYMPHOCYTES ABSOLUTE: 1.3 K/UL (ref 1–5.1)
LYMPHOCYTES RELATIVE PERCENT: 29.9 %
MCH RBC QN AUTO: 29.2 PG (ref 26–34)
MCHC RBC AUTO-ENTMCNC: 33.5 G/DL (ref 31–36)
MCV RBC AUTO: 87.3 FL (ref 80–100)
MICROSCOPIC EXAMINATION: ABNORMAL
MONOCYTES ABSOLUTE: 0.3 K/UL (ref 0–1.3)
MONOCYTES RELATIVE PERCENT: 7 %
NEUTROPHILS ABSOLUTE: 2.7 K/UL (ref 1.7–7.7)
NEUTROPHILS RELATIVE PERCENT: 59.5 %
NITRITE, URINE: NEGATIVE
PDW BLD-RTO: 12.9 % (ref 12.4–15.4)
PH UA: 6 (ref 5–8)
PLATELET # BLD: 192 K/UL (ref 135–450)
PMV BLD AUTO: 8.8 FL (ref 5–10.5)
POTASSIUM SERPL-SCNC: 4.6 MMOL/L (ref 3.5–5.1)
PROTEIN UA: NEGATIVE MG/DL
RBC # BLD: 4.75 M/UL (ref 4.2–5.9)
SODIUM BLD-SCNC: 138 MMOL/L (ref 136–145)
SPECIFIC GRAVITY UA: 1.03 (ref 1–1.03)
T4 FREE: 1.4 NG/DL (ref 0.9–1.8)
TOTAL PROTEIN: 7.4 G/DL (ref 6.4–8.2)
TSH REFLEX: 0.6 UIU/ML (ref 0.27–4.2)
URINE REFLEX TO CULTURE: ABNORMAL
URINE TYPE: ABNORMAL
UROBILINOGEN, URINE: 0.2 E.U./DL
WBC # BLD: 4.5 K/UL (ref 4–11)

## 2022-08-01 ENCOUNTER — OFFICE VISIT (OUTPATIENT)
Dept: INTERNAL MEDICINE CLINIC | Age: 22
End: 2022-08-01
Payer: COMMERCIAL

## 2022-08-01 VITALS
BODY MASS INDEX: 22.82 KG/M2 | DIASTOLIC BLOOD PRESSURE: 90 MMHG | SYSTOLIC BLOOD PRESSURE: 130 MMHG | HEART RATE: 100 BPM | TEMPERATURE: 98.6 F | OXYGEN SATURATION: 99 % | WEIGHT: 163 LBS | HEIGHT: 71 IN

## 2022-08-01 DIAGNOSIS — J30.1 SEASONAL ALLERGIC RHINITIS DUE TO POLLEN: ICD-10-CM

## 2022-08-01 DIAGNOSIS — R03.0 ELEVATED BP WITHOUT DIAGNOSIS OF HYPERTENSION: ICD-10-CM

## 2022-08-01 PROCEDURE — 99213 OFFICE O/P EST LOW 20 MIN: CPT | Performed by: INTERNAL MEDICINE

## 2022-08-01 RX ORDER — FLUTICASONE PROPIONATE 50 MCG
1 SPRAY, SUSPENSION (ML) NASAL 2 TIMES DAILY PRN
Qty: 32 G | Refills: 1 | Status: SHIPPED | OUTPATIENT
Start: 2022-08-01 | End: 2022-10-10

## 2022-08-01 ASSESSMENT — ENCOUNTER SYMPTOMS
ABDOMINAL PAIN: 0
SINUS PAIN: 0
EYE PAIN: 0
TROUBLE SWALLOWING: 0
EYE DISCHARGE: 0
NAUSEA: 0
VOMITING: 0
BLOOD IN STOOL: 0
COUGH: 0
CHEST TIGHTNESS: 0
RHINORRHEA: 0
SHORTNESS OF BREATH: 0
SINUS PRESSURE: 0
WHEEZING: 0
SORE THROAT: 0

## 2022-08-01 NOTE — PROGRESS NOTES
2022     Bonita Lara (: 2000) is a 22 y.o. male, here for evaluation of the following medical concerns:    Chief Complaint   Patient presents with    Dizziness     2 week follow-up, doing much better         Random headache--may be due to work or low sugar or blood pressure high so he needs to keep a check on that and he needs to quit his salty foods otherwise he will need blood pressure medication. As he had fries yesterday. Bp not checked     Pulse increased w 1 half cup coffee    Had low sugar--eating better---also adding snacks. Red chuy last night--burger / fries    Exercise --cycling some--5-6 times--only and he knows he needs to increase that and explained to him that if he does regular cardio exercises her heart rate should settle down. Review of Systems   Constitutional:  Negative for appetite change, chills, fever and unexpected weight change. HENT:  Negative for congestion, ear discharge, ear pain, nosebleeds, rhinorrhea, sinus pressure, sinus pain, sore throat and trouble swallowing. Eyes:  Negative for pain and discharge. Respiratory:  Negative for cough, chest tightness, shortness of breath and wheezing. Cardiovascular:  Negative for chest pain, palpitations and leg swelling. Gastrointestinal:  Negative for abdominal pain, blood in stool, nausea and vomiting. Endocrine: Negative for polydipsia and polyphagia. Genitourinary:  Negative for difficulty urinating, enuresis, flank pain and hematuria. Musculoskeletal:  Negative for myalgias. Skin:  Negative for rash. Neurological:  Negative for facial asymmetry, weakness, light-headedness, numbness and headaches. Psychiatric/Behavioral:  Negative for confusion. No current outpatient medications on file prior to visit. No current facility-administered medications on file prior to visit.       Past Medical History:   Diagnosis Date    Dizziness 2022    Seasonal allergic rhinitis due to pollen 8/1/2022    Tinnitus       Social History     Tobacco Use    Smoking status: Never    Smokeless tobacco: Never   Substance Use Topics    Alcohol use: Never      Family History   Problem Relation Age of Onset    No Known Problems Mother     No Known Problems Father     Stroke Paternal Grandmother         Vitals:    08/01/22 1606 08/01/22 1613   BP: (!) 134/90 (!) 130/90   Site: Left Upper Arm Left Upper Arm   Position: Sitting Sitting   Cuff Size: Large Adult Large Adult   Pulse: 100    Temp: 98.6 °F (37 °C)    TempSrc: Temporal    SpO2: 99%    Weight: 163 lb (73.9 kg)    Height: 5' 11\" (1.803 m)      Estimated body mass index is 22.73 kg/m² as calculated from the following:    Height as of this encounter: 5' 11\" (1.803 m). Weight as of this encounter: 163 lb (73.9 kg). Physical Exam  Vitals and nursing note reviewed. Constitutional:       General: He is not in acute distress. HENT:      Head: Normocephalic and atraumatic. Right Ear: Tympanic membrane, ear canal and external ear normal.      Left Ear: Tympanic membrane, ear canal and external ear normal.   Eyes:      General: Lids are normal.      Conjunctiva/sclera: Conjunctivae normal.      Pupils: Pupils are equal, round, and reactive to light. Neck:      Thyroid: No thyromegaly. Vascular: No JVD. Trachea: No tracheal deviation. Cardiovascular:      Rate and Rhythm: Normal rate and regular rhythm. Heart sounds: Normal heart sounds. No gallop. Pulmonary:      Effort: Pulmonary effort is normal. No respiratory distress. Breath sounds: Normal breath sounds. No wheezing or rales. Abdominal:      General: Bowel sounds are normal.      Palpations: Abdomen is soft. There is no mass. Tenderness: There is no abdominal tenderness. Musculoskeletal:         General: No tenderness. Cervical back: Neck supple. Comments: No leg edema or calf tenderness   Lymphadenopathy:      Cervical: No cervical adenopathy. Skin:     General: Skin is warm and dry. Findings: No rash. Neurological:      Mental Status: He is alert and oriented to person, place, and time. Cranial Nerves: No cranial nerve deficit. Sensory: No sensory deficit. Psychiatric:         Behavior: Behavior normal.         Thought Content: Thought content normal.       ASSESSMENT/PLAN:  1. Seasonal allergic rhinitis due to pollen    - fluticasone (FLONASE) 50 MCG/ACT nasal spray; 1 spray by Each Nostril route 2 times daily as needed for Rhinitis  Dispense: 32 g; Refill: 1    2. Elevated BP without diagnosis of hypertension  Healthy lifestyle      Return if symptoms worsen or fail to improve. Patient Instructions   Allergy medicines as needed. No caffeine --keep heart rate less than 100 resting    Make sure eat a fruit before exercising. Slowly build up regular exercise for 3 miles elliptical 4 days a week   Make sure all is eating 3 meals a day and fruit snack in between  hypertension    Extensive counseling done to keep low sodium diet and  Avoid potato chips, pretzels, sauerkraut , ham , sausage, harvey , salty crackers , salty french fries, salty nuts, salty popcorn etc.  Use only low sodium soups. No salted canned vegetables. Use fresh or frozen vegetables. No salt shaker use. May use Mrs. Goldsmith as a salt substitute. Avoid weight gain. Regular exercise program.     Electronically signed by Juana Kohli MD on 8/1/2022 at 4:41 PM     This dictation was generated by voice recognition computer software. Although all attempts are made to edit the dictation for accuracy, there may be errors in the transcription that are not intended.

## 2022-10-10 ENCOUNTER — HOSPITAL ENCOUNTER (EMERGENCY)
Age: 22
Discharge: HOME | End: 2022-10-10
Attending: EMERGENCY MEDICINE
Payer: OTHER MISCELLANEOUS

## 2022-10-10 ENCOUNTER — APPOINTMENT (OUTPATIENT)
Dept: GENERAL RADIOLOGY | Age: 22
End: 2022-10-10
Payer: OTHER MISCELLANEOUS

## 2022-10-10 VITALS
TEMPERATURE: 97.9 F | BODY MASS INDEX: 23.52 KG/M2 | WEIGHT: 168 LBS | HEIGHT: 71 IN | DIASTOLIC BLOOD PRESSURE: 89 MMHG | RESPIRATION RATE: 19 BRPM | HEART RATE: 70 BPM | SYSTOLIC BLOOD PRESSURE: 135 MMHG | OXYGEN SATURATION: 100 %

## 2022-10-10 DIAGNOSIS — S69.91XA INJURY OF RIGHT MIDDLE FINGER, INITIAL ENCOUNTER: Primary | ICD-10-CM

## 2022-10-10 PROCEDURE — 99283 EMERGENCY DEPT VISIT LOW MDM: CPT

## 2022-10-10 PROCEDURE — 73130 X-RAY EXAM OF HAND: CPT

## 2022-10-10 NOTE — ED NOTES
Pt declined TDAP. Education given on importance of needing TDAP since it has been 8 years since last received. Pt verbalized understanding. Discharge instructions given. Verbalized understanding. Denies further questions or concerns. A&Ox4. Ambulates from ED with steady gait.       Artem Dahl RN  10/10/22 [id number]
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
## 2022-10-25 ENCOUNTER — TELEPHONE (OUTPATIENT)
Dept: INTERNAL MEDICINE CLINIC | Age: 22
End: 2022-10-25

## 2022-11-16 ENCOUNTER — TELEPHONE (OUTPATIENT)
Dept: INTERNAL MEDICINE CLINIC | Age: 22
End: 2022-11-16

## 2022-11-16 ENCOUNTER — TELEMEDICINE (OUTPATIENT)
Dept: INTERNAL MEDICINE CLINIC | Age: 22
End: 2022-11-16
Payer: COMMERCIAL

## 2022-11-16 DIAGNOSIS — R05.1 ACUTE COUGH: ICD-10-CM

## 2022-11-16 DIAGNOSIS — J06.9 ACUTE URI: Primary | ICD-10-CM

## 2022-11-16 PROCEDURE — 99213 OFFICE O/P EST LOW 20 MIN: CPT | Performed by: INTERNAL MEDICINE

## 2022-11-16 RX ORDER — MONTELUKAST SODIUM 10 MG/1
10 TABLET ORAL DAILY
Qty: 30 TABLET | Refills: 0 | Status: SHIPPED | OUTPATIENT
Start: 2022-11-16

## 2022-11-16 SDOH — ECONOMIC STABILITY: FOOD INSECURITY: WITHIN THE PAST 12 MONTHS, YOU WORRIED THAT YOUR FOOD WOULD RUN OUT BEFORE YOU GOT MONEY TO BUY MORE.: NEVER TRUE

## 2022-11-16 SDOH — ECONOMIC STABILITY: FOOD INSECURITY: WITHIN THE PAST 12 MONTHS, THE FOOD YOU BOUGHT JUST DIDN'T LAST AND YOU DIDN'T HAVE MONEY TO GET MORE.: NEVER TRUE

## 2022-11-16 ASSESSMENT — ENCOUNTER SYMPTOMS
TROUBLE SWALLOWING: 0
NAUSEA: 0
ABDOMINAL PAIN: 0
WHEEZING: 0
RHINORRHEA: 1
SORE THROAT: 0
COUGH: 1
CHEST TIGHTNESS: 0
VOMITING: 0
EYE PAIN: 0
EYE DISCHARGE: 0
SHORTNESS OF BREATH: 0
SINUS PRESSURE: 1
BLOOD IN STOOL: 0

## 2022-11-16 ASSESSMENT — SOCIAL DETERMINANTS OF HEALTH (SDOH): HOW HARD IS IT FOR YOU TO PAY FOR THE VERY BASICS LIKE FOOD, HOUSING, MEDICAL CARE, AND HEATING?: NOT HARD AT ALL

## 2022-11-16 ASSESSMENT — PATIENT HEALTH QUESTIONNAIRE - PHQ9
SUM OF ALL RESPONSES TO PHQ9 QUESTIONS 1 & 2: 0
SUM OF ALL RESPONSES TO PHQ QUESTIONS 1-9: 0
2. FEELING DOWN, DEPRESSED OR HOPELESS: 0
SUM OF ALL RESPONSES TO PHQ QUESTIONS 1-9: 0
1. LITTLE INTEREST OR PLEASURE IN DOING THINGS: 0
SUM OF ALL RESPONSES TO PHQ QUESTIONS 1-9: 0
SUM OF ALL RESPONSES TO PHQ QUESTIONS 1-9: 0

## 2022-11-16 NOTE — LETTER
[facility] [address]  [address] [address]  Phone: [phone]  Fax: [phone]    [facility], MD        November 16, 2022     Patient: Deloise Litten   YOB: 2000   Date of Visit: 11/16/2022       To Whom it May Concern:    Jorden Vallecillo was seen in my clinic on 11/16/2022. He may return to school on 11/17/22. If you have any questions or concerns, please don't hesitate to call.     Sincerely,         [facility], MD

## 2022-11-16 NOTE — PROGRESS NOTES
tried antihistamine, decongestant and increased fluids for the symptoms. The treatment provided moderate relief. Review of Systems   Constitutional:  Negative for appetite change, chills, fever and unexpected weight change. HENT:  Positive for rhinorrhea and sinus pressure. Negative for congestion, ear discharge, ear pain, nosebleeds, sore throat and trouble swallowing. Eyes:  Negative for pain and discharge. Respiratory:  Positive for cough. Negative for chest tightness, shortness of breath and wheezing. Cardiovascular:  Negative for chest pain, palpitations and leg swelling. Gastrointestinal:  Negative for abdominal pain, blood in stool, nausea and vomiting. Endocrine: Negative for polydipsia and polyphagia. Genitourinary:  Negative for difficulty urinating, enuresis, flank pain and hematuria. Musculoskeletal:  Negative for myalgias. Skin:  Negative for rash. Neurological:  Negative for facial asymmetry, weakness, light-headedness, numbness and headaches. Psychiatric/Behavioral:  Negative for confusion.          Objective   Patient-Reported Vitals  No data recorded     Physical Exam  [INSTRUCTIONS:  \"[x]\" Indicates a positive item  \"[]\" Indicates a negative item  -- DELETE ALL ITEMS NOT EXAMINED]    Constitutional: [x] Appears well-developed and well-nourished [x] No apparent distress      [] Abnormal -     Mental status: [x] Alert and awake  [x] Oriented to person/place/time [x] Able to follow commands    [] Abnormal -     Eyes:   EOM    [x]  Normal    [] Abnormal -   Sclera  [x]  Normal    [] Abnormal -          Discharge [x]  None visible   [] Abnormal -     HENT: [x] Normocephalic, atraumatic  [] Abnormal -   [] Mouth/Throat: Mucous membranes are moist  Nose congestion  External Ears [x] Normal  [] Abnormal -    Neck: [x] No visualized mass [] Abnormal -     Pulmonary/Chest: [x] Respiratory effort normal   [x] No visualized signs of difficulty breathing or respiratory distress [] Abnormal -   No cough during the virtual visit  Musculoskeletal:   [x] Normal gait with no signs of ataxia         [x] Normal range of motion of neck        [] Abnormal -     Neurological:        [x] No Facial Asymmetry (Cranial nerve 7 motor function) (limited exam due to video visit)          [x] No gaze palsy        [] Abnormal -          Skin:        [x] No significant exanthematous lesions or discoloration noted on facial skin         [] Abnormal -            Psychiatric:       [x] Normal Affect [] Abnormal -        [x] No Hallucinations    Other pertinent observable physical exam findings:-         On this date 11/16/2022 I have spent 20 minutes reviewing previous notes, test results and face to face (virtual) with the patient discussing the diagnosis and importance of compliance with the treatment plan as well as documenting on the day of the visit. Kianna Milligan, was evaluated through a synchronous (real-time) audio-video encounter. The patient (or guardian if applicable) is aware that this is a billable service, which includes applicable co-pays. This Virtual Visit was conducted with patient's (and/or legal guardian's) consent. The visit was conducted pursuant to the emergency declaration under the [address] [place] [address], [address] [place] [address] authority and the [employer] and [employer] General Act. Patient identification was verified, and a caregiver was present when appropriate. The patient was located at Home: [address] [address]. Provider was located at [facility] ([address] [place], [place] [address]t): [phone] E. [address] [place] [address], [address] [place] [address],  [address].         --Marie Townsend MD

## 2022-11-16 NOTE — PATIENT INSTRUCTIONS
Call Friday if not getting better and need antibiotic in addition to montelukast which I am sending prescription right now. do home covid 19 test right now. Pulse oximeter--keep pulse ox >90 %    Vit C 1000 milligrams every day    Montelukast 10 mg a day and take it as long as it needs for cough. Mucinex DM or Robitussin-DM and Sudafed okay to take. Plenty of fluids and make sure you are eating 3 meals a day. Discussed use, benefit, and side effects of prescribed medications. Barriers to compliance discussed. All patient questions answered. Pt voiced understanding. IF YOU NEED A PRESCRIPTION REFILL, THEN PLEASE GIVE US THREE WORKING DAYS TO REFILL A PRESCRIPTION. May go to urgent care or Emergency room or call to be seen in the office sooner than scheduled follow-up appointment,if condition worsens.

## 2022-11-16 NOTE — TELEPHONE ENCOUNTER
----- Message from Farideh Ramesh sent at 11/16/2022  4:47 PM EST -----  Subject: Message to Provider    QUESTIONS  Information for Provider? Patient had a virtual visit with Dr Pepe Nascimento today   11/16/2022 at 100 pm. Patient is requesting a doctor letter regarding   visit for school. Patient states the letter can be sent to My Chart or   email - [email or web address]. Please contact the patient with any question   regarding request.   ---------------------------------------------------------------------------  --------------  Nayeli Simpson [facility]  [phone]; OK to leave message on voicemail, OK to respond with   electronic message via [employer] portal (only for patients who have   registered [employer] account)  ---------------------------------------------------------------------------  --------------  SCRIPT ANSWERS  Relationship to Patient?  Self

## 2023-05-13 PROBLEM — Z00.00 ANNUAL PHYSICAL EXAM: Status: RESOLVED | Noted: 2020-08-05 | Resolved: 2023-05-13

## 2023-05-22 NOTE — TELEPHONE ENCOUNTER
Pt would like a call back regarding HPV vaccine and would like to know when he should get his next vaccine for it     Please call and advise Subjective     Patient ID: Jim Ryan is a 67 y.o. male.    Chief Complaint: Follow-up (Patient is here for a follow up visit )    HPI    F/u LE wound s/p I&D and wound vac  Healing very well, wound care once weekly now  DM - a1c is pending  Aimovig working well for migraine prevention. Having some infrequent breakthrough headaches that respond well to tylenol  DM - reviewed bg log, pt is using freestyle peewee for monitoring    Review of Systems   Constitutional:  Negative for chills, diaphoresis, fatigue and fever.   Eyes:  Negative for visual disturbance.   Respiratory:  Negative for cough, chest tightness, shortness of breath and wheezing.    Cardiovascular:  Negative for chest pain, palpitations and leg swelling.   Gastrointestinal:  Negative for abdominal pain, constipation, diarrhea, nausea and vomiting.   Genitourinary:  Negative for decreased urine volume and frequency.   Musculoskeletal:  Negative for arthralgias and myalgias.   Integumentary:  Negative for color change, pallor and rash.   Neurological:  Negative for dizziness, syncope, weakness, light-headedness and headaches.   Psychiatric/Behavioral:  Negative for dysphoric mood and sleep disturbance. The patient is not nervous/anxious.         Objective     Physical Exam  Vitals reviewed.   Constitutional:       General: He is not in acute distress.     Appearance: He is well-developed. He is not diaphoretic.   HENT:      Head: Normocephalic and atraumatic.      Right Ear: External ear normal.      Left Ear: External ear normal.      Nose: Nose normal.   Eyes:      Conjunctiva/sclera: Conjunctivae normal.      Pupils: Pupils are equal, round, and reactive to light.   Neck:      Thyroid: No thyromegaly.      Vascular: No JVD.   Cardiovascular:      Rate and Rhythm: Normal rate and regular rhythm.      Pulses: Normal pulses.      Heart sounds: Normal heart sounds. No murmur heard.    No friction rub. No gallop.   Pulmonary:      Effort: Pulmonary  effort is normal. No respiratory distress.      Breath sounds: Normal breath sounds. No stridor. No wheezing.   Abdominal:      General: Bowel sounds are normal. There is no distension.      Palpations: Abdomen is soft.      Tenderness: There is no abdominal tenderness.   Musculoskeletal:         General: No tenderness. Normal range of motion.      Cervical back: Normal range of motion and neck supple.      Right lower leg: No edema.      Left lower leg: No edema.   Lymphadenopathy:      Cervical: No cervical adenopathy.   Skin:     General: Skin is warm and dry.      Coloration: Skin is not pale.      Findings: No erythema or rash.   Neurological:      General: No focal deficit present.      Mental Status: He is alert and oriented to person, place, and time.   Psychiatric:         Mood and Affect: Mood normal.         Behavior: Behavior normal.          Assessment and Plan     Problem List Items Addressed This Visit          Endocrine    Morbid (severe) obesity due to excess calories    Type 2 diabetes mellitus with diabetic peripheral angiopathy without gangrene, with long-term current use of insulin       Orthopedic    Infected traumatic ulcer of lower extremity, unspecified laterality, unspecified ulcer stage - Primary     Other Visit Diagnoses       Migraine with aura and without status migrainosus, not intractable        doing well on aimovig, given nurtec and ubrelvy samples          Infected traumatic ulcer of lower extremity, unspecified laterality, unspecified ulcer stage  Comments:  wound care weekly x 4 weeks, nearly healed     Type 2 diabetes mellitus with diabetic peripheral angiopathy without gangrene, with long-term current use of insulin  Comments:  a1c pending - will adjust ozempic if needed based on results    Migraine with aura and without status migrainosus, not intractable  Comments:  doing well on aimovig, given nurtec and ubrelvy samples    Morbid (severe) obesity due to excess  calories  Comments:  counseled on wgt management

## 2023-12-28 ENCOUNTER — OFFICE VISIT (OUTPATIENT)
Dept: INTERNAL MEDICINE CLINIC | Age: 23
End: 2023-12-28
Payer: COMMERCIAL

## 2023-12-28 VITALS
DIASTOLIC BLOOD PRESSURE: 70 MMHG | BODY MASS INDEX: 23.52 KG/M2 | HEIGHT: 71 IN | SYSTOLIC BLOOD PRESSURE: 126 MMHG | WEIGHT: 168 LBS

## 2023-12-28 DIAGNOSIS — R41.840 DIFFICULTY CONCENTRATING: Primary | ICD-10-CM

## 2023-12-28 PROCEDURE — 99214 OFFICE O/P EST MOD 30 MIN: CPT | Performed by: HOSPITALIST

## 2023-12-28 NOTE — PROGRESS NOTES
Annmarie Mustafa MD    This dictation was generated by voice recognition computer software. Although all attempts are made to edit the dictation for accuracy, there may be errors in the transcription that are not intended.

## 2024-07-18 ENCOUNTER — OFFICE VISIT (OUTPATIENT)
Dept: FAMILY MEDICINE CLINIC | Age: 24
End: 2024-07-18
Payer: COMMERCIAL

## 2024-07-18 VITALS
TEMPERATURE: 98.7 F | DIASTOLIC BLOOD PRESSURE: 72 MMHG | OXYGEN SATURATION: 99 % | HEIGHT: 71 IN | WEIGHT: 168 LBS | HEART RATE: 105 BPM | SYSTOLIC BLOOD PRESSURE: 138 MMHG | BODY MASS INDEX: 23.52 KG/M2

## 2024-07-18 DIAGNOSIS — R10.13 DYSPEPSIA: ICD-10-CM

## 2024-07-18 DIAGNOSIS — Z77.098 EXPOSURE TO CHEMICAL INHALATION: Primary | ICD-10-CM

## 2024-07-18 PROCEDURE — 99214 OFFICE O/P EST MOD 30 MIN: CPT | Performed by: NURSE PRACTITIONER

## 2024-07-18 SDOH — ECONOMIC STABILITY: FOOD INSECURITY: WITHIN THE PAST 12 MONTHS, THE FOOD YOU BOUGHT JUST DIDN'T LAST AND YOU DIDN'T HAVE MONEY TO GET MORE.: NEVER TRUE

## 2024-07-18 SDOH — ECONOMIC STABILITY: FOOD INSECURITY: WITHIN THE PAST 12 MONTHS, YOU WORRIED THAT YOUR FOOD WOULD RUN OUT BEFORE YOU GOT MONEY TO BUY MORE.: NEVER TRUE

## 2024-07-18 SDOH — ECONOMIC STABILITY: HOUSING INSECURITY
IN THE LAST 12 MONTHS, WAS THERE A TIME WHEN YOU DID NOT HAVE A STEADY PLACE TO SLEEP OR SLEPT IN A SHELTER (INCLUDING NOW)?: NO

## 2024-07-18 SDOH — ECONOMIC STABILITY: INCOME INSECURITY: HOW HARD IS IT FOR YOU TO PAY FOR THE VERY BASICS LIKE FOOD, HOUSING, MEDICAL CARE, AND HEATING?: NOT HARD AT ALL

## 2024-07-18 ASSESSMENT — ENCOUNTER SYMPTOMS
SINUS PRESSURE: 0
CONSTIPATION: 0
SINUS PAIN: 0
WHEEZING: 0
COLOR CHANGE: 0
DIARRHEA: 0
SHORTNESS OF BREATH: 0
COUGH: 0
BACK PAIN: 0
ABDOMINAL PAIN: 0

## 2024-07-18 ASSESSMENT — PATIENT HEALTH QUESTIONNAIRE - PHQ9
SUM OF ALL RESPONSES TO PHQ QUESTIONS 1-9: 0
DEPRESSION UNABLE TO ASSESS: FUNCTIONAL CAPACITY MOTIVATION LIMITS ACCURACY
1. LITTLE INTEREST OR PLEASURE IN DOING THINGS: NOT AT ALL
SUM OF ALL RESPONSES TO PHQ QUESTIONS 1-9: 0
SUM OF ALL RESPONSES TO PHQ9 QUESTIONS 1 & 2: 0
2. FEELING DOWN, DEPRESSED OR HOPELESS: NOT AT ALL

## 2024-07-18 NOTE — PROGRESS NOTES
medications for this visit.       Review of Systems   Constitutional:  Negative for chills, fatigue and fever.   HENT:  Negative for congestion, sinus pressure and sinus pain.    Respiratory:  Negative for cough, shortness of breath and wheezing.         Sensation of irritation to lungs    Cardiovascular:  Negative for chest pain and palpitations.   Gastrointestinal:  Negative for abdominal pain, constipation and diarrhea.        Dyspepsia   Musculoskeletal:  Negative for arthralgias, back pain and myalgias.   Skin:  Negative for color change, pallor and rash.   Neurological:  Negative for dizziness, syncope, weakness, light-headedness and headaches.   Psychiatric/Behavioral:  Negative for behavioral problems, confusion and sleep disturbance. The patient is not nervous/anxious.        Vitals:    07/18/24 1234   BP: 138/72   Site: Right Upper Arm   Position: Sitting   Cuff Size: Medium Adult   Pulse: (!) 105   Temp: 98.7 °F (37.1 °C)   SpO2: 99%   Weight: 76.2 kg (168 lb)   Height: 1.803 m (5' 11\")       Physical Exam  Constitutional:       Appearance: He is well-developed.   HENT:      Head: Normocephalic and atraumatic.      Nose:      Right Nostril: No epistaxis.      Left Nostril: No epistaxis.      Right Turbinates: Not enlarged, swollen or pale.      Left Turbinates: Not enlarged, swollen or pale.      Mouth/Throat:      Pharynx: Oropharynx is clear. No pharyngeal swelling or posterior oropharyngeal erythema.   Eyes:      Conjunctiva/sclera: Conjunctivae normal.      Pupils: Pupils are equal, round, and reactive to light.   Neck:      Thyroid: No thyromegaly.      Vascular: No JVD.   Cardiovascular:      Rate and Rhythm: Normal rate and regular rhythm.      Heart sounds: Normal heart sounds.   Pulmonary:      Effort: Pulmonary effort is normal. No respiratory distress.      Breath sounds: Normal breath sounds. No wheezing.   Musculoskeletal:         General: No deformity. Normal range of motion.      Cervical

## 2024-07-18 NOTE — ASSESSMENT & PLAN NOTE
O2 sats reassuring   Lungs clear   No wheezing, cough, SOB   Low suspicion for lung tissue damage. Reassurance provided that any damage would have likely been evident within the first 24 hours.   If symptoms persist, will obtain CT. No concern for acute respiratory issue or hypoxia

## 2024-10-10 ENCOUNTER — OFFICE VISIT (OUTPATIENT)
Dept: INTERNAL MEDICINE CLINIC | Age: 24
End: 2024-10-10
Payer: COMMERCIAL

## 2024-10-10 VITALS
DIASTOLIC BLOOD PRESSURE: 86 MMHG | SYSTOLIC BLOOD PRESSURE: 124 MMHG | OXYGEN SATURATION: 99 % | WEIGHT: 159 LBS | BODY MASS INDEX: 22.26 KG/M2 | TEMPERATURE: 97.3 F | HEART RATE: 70 BPM | HEIGHT: 71 IN

## 2024-10-10 DIAGNOSIS — Z00.00 ENCOUNTER FOR WELL ADULT EXAM WITHOUT ABNORMAL FINDINGS: Primary | ICD-10-CM

## 2024-10-10 PROCEDURE — 99395 PREV VISIT EST AGE 18-39: CPT | Performed by: HOSPITALIST

## 2024-10-10 ASSESSMENT — ENCOUNTER SYMPTOMS
VOICE CHANGE: 0
NAUSEA: 0
VOMITING: 0
SINUS PAIN: 0
BACK PAIN: 0
WHEEZING: 0
CHEST TIGHTNESS: 0
SINUS PRESSURE: 0
COUGH: 0
DIARRHEA: 0
BLOOD IN STOOL: 0
SORE THROAT: 0
ABDOMINAL DISTENTION: 0
SHORTNESS OF BREATH: 0
ABDOMINAL PAIN: 0
CONSTIPATION: 0
TROUBLE SWALLOWING: 0

## 2024-10-10 NOTE — PROGRESS NOTES
Well Adult Note  Name: John Martin Today’s Date: 10/10/2024   MRN: [id number] Sex: Male   Age: 23 y.o. Ethnicity: Non- / Non    : 2000 Race: White (non-)      John Martin is here for a well adult exam.       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  He is in his usual state of health.  He is seeing [facility] for [facility] exams.    Review of Systems   Constitutional:  Negative for activity change, appetite change, chills, diaphoresis, fatigue, fever and unexpected weight change.   HENT:  Negative for sinus pressure, sinus pain, sore throat, trouble swallowing and voice change.    Eyes:  Negative for visual disturbance.   Respiratory:  Negative for cough, chest tightness, shortness of breath and wheezing.    Cardiovascular:  Negative for chest pain, palpitations and leg swelling.   Gastrointestinal:  Negative for abdominal distention, abdominal pain, blood in stool, constipation, diarrhea, nausea and vomiting.   Endocrine: Negative for polydipsia and polyphagia.   Genitourinary:  Negative for decreased urine volume, difficulty urinating, dysuria and urgency.   Musculoskeletal:  Negative for back pain, gait problem, joint swelling and myalgias.   Neurological:  Negative for dizziness, seizures, syncope, light-headedness and headaches.   Psychiatric/Behavioral:  Negative for agitation, behavioral problems, confusion and suicidal ideas.        No Known Allergies  Prior to Visit Medications    Medication Sig Taking? Authorizing Provider   Fexofenadine HCl (ALLEGRA ALLERGY PO) Take by mouth Yes Provider, MD Laura     Past Medical History:   Diagnosis Date    Dizziness 2022    Seasonal allergic rhinitis due to pollen 2022    Tinnitus      No past surgical history on file.  Family History   Problem Relation Age of Onset    No Known Problems Mother     No Known Problems Father     Stroke

## 2024-10-10 NOTE — PATIENT INSTRUCTIONS
hands, brush your teeth twice a day, and wear a seat belt in the car.   Where can you learn more?  Go to https://www.[employer].net/patientEd and enter P072 to learn more about \"Well Visit, Ages 18 to 65: Care Instructions.\"  Current as of: August 6, 2023  Content Version: 14.2  © 2024 [email or web address].   Care instructions adapted under license by [employer]. If you have questions about a medical condition or this instruction, always ask your healthcare professional. [employer] disclaims any warranty or liability for your use of this information.

## 2024-12-27 ENCOUNTER — OFFICE VISIT (OUTPATIENT)
Dept: FAMILY MEDICINE CLINIC | Age: 24
End: 2024-12-27

## 2024-12-27 VITALS
OXYGEN SATURATION: 97 % | HEIGHT: 71 IN | HEART RATE: 81 BPM | WEIGHT: 162 LBS | SYSTOLIC BLOOD PRESSURE: 120 MMHG | TEMPERATURE: 98.5 F | BODY MASS INDEX: 22.68 KG/M2 | DIASTOLIC BLOOD PRESSURE: 82 MMHG

## 2024-12-27 DIAGNOSIS — R20.2 TINGLING SENSATION: ICD-10-CM

## 2024-12-27 DIAGNOSIS — R07.89 CHEST TIGHTNESS: Primary | ICD-10-CM

## 2024-12-27 ASSESSMENT — ENCOUNTER SYMPTOMS
COLOR CHANGE: 0
ABDOMINAL PAIN: 0
SINUS PAIN: 0
SHORTNESS OF BREATH: 0
CHEST TIGHTNESS: 1
DIARRHEA: 0
BACK PAIN: 0
WHEEZING: 0
SINUS PRESSURE: 0
COUGH: 0
CONSTIPATION: 0

## 2024-12-27 NOTE — PROGRESS NOTES
John Martin (:  2000) is a 24 y.o. male,Established patient, here for evaluation of the following chief complaint(s):  Wrist Pain (Pain/tingling in wrists when exhaling, chest tightness)         Assessment & Plan  Chest tightness   Acute condition, new, occurred for 1 episode.  Has not had any symptoms since.  Encouraged him to complete pending blood work.  Low suspicion for cardiac etiology.         Tingling sensation   Acute condition, new, occurring in bilateral legs for the past 2 weeks.  No pain.  Can trial NSAIDs to reduce any inflammation.  Will check labs.  Call if no better           No follow-ups on file.         Subjective   HPI  Here for dizziness that occurred last week.  He states that he was staying out of town with his fiancée's family and began to notice chest tightness as he was laying down trying to go to sleep.  He typically works third shift and was having trouble falling asleep so he took 2 melatonin which she does not normally do.  Denies any chest pain, palpitations, shortness of breath.  Chest tightness was on the left side of his chest.  Did not radiate.  He did note some pressure in his shoulder.  Has not had any symptoms since this episode.  He also states that he has been having tingling sensation in bilateral wrists over the last 2 weeks on occasion.  This occurs mostly when he exhales.  She denies any pain or numbness.  He does state that he recently was celebrating family member's birthday and played a lot of arcade games and bowling.  Noticed tingling sensation in wrist a couple days after this.      No Known Allergies    Current Outpatient Medications   Medication Sig Dispense Refill    Fexofenadine HCl (ALLEGRA ALLERGY PO) Take by mouth       No current facility-administered medications for this visit.       Review of Systems   Constitutional:  Negative for chills, fatigue and fever.   HENT:  Negative for congestion, sinus pressure and sinus pain.    Respiratory:   Grade II hypertensive retinopathy.

## 2024-12-27 NOTE — ASSESSMENT & PLAN NOTE
Acute condition, new, occurring in bilateral legs for the past 2 weeks.  No pain.  Can trial NSAIDs to reduce any inflammation.  Will check labs.  Call if no better

## 2024-12-27 NOTE — ASSESSMENT & PLAN NOTE
Acute condition, new, occurred for 1 episode.  Has not had any symptoms since.  Encouraged him to complete pending blood work.  Low suspicion for cardiac etiology.

## 2025-02-04 ENCOUNTER — OFFICE VISIT (OUTPATIENT)
Dept: FAMILY MEDICINE CLINIC | Age: 25
End: 2025-02-04

## 2025-02-04 VITALS
HEIGHT: 71 IN | TEMPERATURE: 98.5 F | BODY MASS INDEX: 22.68 KG/M2 | OXYGEN SATURATION: 99 % | HEART RATE: 99 BPM | WEIGHT: 162 LBS | SYSTOLIC BLOOD PRESSURE: 110 MMHG | DIASTOLIC BLOOD PRESSURE: 82 MMHG

## 2025-02-04 DIAGNOSIS — R51.9 NONINTRACTABLE HEADACHE, UNSPECIFIED CHRONICITY PATTERN, UNSPECIFIED HEADACHE TYPE: Primary | ICD-10-CM

## 2025-02-04 SDOH — ECONOMIC STABILITY: FOOD INSECURITY: WITHIN THE PAST 12 MONTHS, THE FOOD YOU BOUGHT JUST DIDN'T LAST AND YOU DIDN'T HAVE MONEY TO GET MORE.: NEVER TRUE

## 2025-02-04 SDOH — ECONOMIC STABILITY: FOOD INSECURITY: WITHIN THE PAST 12 MONTHS, YOU WORRIED THAT YOUR FOOD WOULD RUN OUT BEFORE YOU GOT MONEY TO BUY MORE.: NEVER TRUE

## 2025-02-04 ASSESSMENT — ENCOUNTER SYMPTOMS
COLOR CHANGE: 0
SHORTNESS OF BREATH: 0
BACK PAIN: 0
DIARRHEA: 0
ABDOMINAL PAIN: 0
WHEEZING: 0
SINUS PRESSURE: 0
CONSTIPATION: 0
SINUS PAIN: 0
COUGH: 0

## 2025-02-04 ASSESSMENT — PATIENT HEALTH QUESTIONNAIRE - PHQ9
SUM OF ALL RESPONSES TO PHQ QUESTIONS 1-9: 0
SUM OF ALL RESPONSES TO PHQ QUESTIONS 1-9: 0
SUM OF ALL RESPONSES TO PHQ9 QUESTIONS 1 & 2: 0
DEPRESSION UNABLE TO ASSESS: FUNCTIONAL CAPACITY MOTIVATION LIMITS ACCURACY
SUM OF ALL RESPONSES TO PHQ QUESTIONS 1-9: 0
SUM OF ALL RESPONSES TO PHQ QUESTIONS 1-9: 0
1. LITTLE INTEREST OR PLEASURE IN DOING THINGS: NOT AT ALL
2. FEELING DOWN, DEPRESSED OR HOPELESS: NOT AT ALL

## 2025-02-04 NOTE — PROGRESS NOTES
John Martin (:  2000) is a 24 y.o. male,Established patient, here for evaluation of the following chief complaint(s):  Headache (Carbon monoxide exposure ~2 weeks ago)      ASSESSMENT/PLAN:  Assessment & Plan  Nonintractable headache, unspecified chronicity pattern, unspecified headache type   New, following carbon monoxide exposure.   Check labs.  Take tylenol or NSAID's as needed for heading.   Follow up pending results.        No follow-ups on file.    SUBJECTIVE/OBJECTIVE:    History of Present Illness  The patient presents for evaluation of carbon monoxide exposure.    He is a  and was exposed to carbon monoxide approximately 2 weeks ago due to inadequate ventilation at his workplace. He experienced an episode of dizziness. Upon realizing the closed environment with the heater running, he identified the potential for carbon monoxide exposure. He promptly exited the premises, experiencing a throbbing headache en route home, which subsided after driving with the window down. However, he has been experiencing persistent headaches and intermittent dizziness since the incident. The headaches are not present upon awakening but tend to occur during periods of increased physical activity or movement. He reports that the headaches are intermittent, lasting between 20 to 25 minutes, and are present throughout the day. He also reports feeling dizzy after work, which improves after rest. 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 He reports no weakness or loss of balance but mentions occasional difficulty in perceiving objects or people behind him. He reports no changes in vision, although he occasionally experiences blurry vision during night shifts due to fatigue. He admits to not maintaining adequate hydration and nutrition during his shifts but ensures sufficient intake on his days off. He also

## 2025-02-04 NOTE — PROGRESS NOTES
John Martin (:  2000) is a 24 y.o. male,Established patient, here for evaluation of the following chief complaint(s):  Headache (Carbon monoxide exposure ~2 weeks ago)      ASSESSMENT/PLAN:  Assessment & Plan  Nonintractable headache, unspecified chronicity pattern, unspecified headache type   New, uncertain prognosis,  will continue treatment with tylenol/ ibuprofen. Increase fluids. Avoid stimulating activities. Neuro exam intact. No red flags. Follow up if no better in two weeks.        No follow-ups on file.    SUBJECTIVE/OBJECTIVE:    History of Present Illness  The patient presents for evaluation of carbon monoxide exposure.    Was exposed to carbon monoxide approximately 2 weeks ago due to inadequate ventilation at his workplace. He experienced an episode of dizziness. Upon realizing the closed environment with the heater running, he identified the potential for carbon monoxide exposure. He promptly exited the premises, experiencing a throbbing headache en route home, which subsided after driving with the window down. However, he has been experiencing persistent headaches and intermittent dizziness since the incident. The headaches are reminiscent of those he experienced during a previous exposure. The headaches are not present upon awakening but tend to occur during periods of increased physical activity or movement. He reports that the headaches are intermittent, lasting between 20 to 25 minutes, and are not present throughout the day.   He also reports feeling dizzy after work, which improves after rest. He has been feeling \"off\" for the past 2 weeks, describing a lack of sharpness in his tasks. He reports no weakness or loss of balance. He admits to not maintaining adequate hydration and nutrition during his shifts but ensures sufficient intake on his days off. He attempted to alleviate the headache with Advil, which provided temporary relief.    Supplemental Information  He is currently

## 2025-06-06 ENCOUNTER — HOSPITAL ENCOUNTER (EMERGENCY)
Age: 25
Discharge: HOME | End: 2025-06-06
Attending: EMERGENCY MEDICINE

## 2025-06-06 ENCOUNTER — TELEPHONE (OUTPATIENT)
Dept: INTERNAL MEDICINE CLINIC | Age: 25
End: 2025-06-06

## 2025-06-06 VITALS
HEART RATE: 75 BPM | SYSTOLIC BLOOD PRESSURE: 135 MMHG | OXYGEN SATURATION: 100 % | BODY MASS INDEX: 22.64 KG/M2 | RESPIRATION RATE: 18 BRPM | DIASTOLIC BLOOD PRESSURE: 80 MMHG | WEIGHT: 161.7 LBS | TEMPERATURE: 98.5 F | HEIGHT: 71 IN

## 2025-06-06 DIAGNOSIS — S06.0X0A CONCUSSION WITHOUT LOSS OF CONSCIOUSNESS, INITIAL ENCOUNTER: Primary | ICD-10-CM

## 2025-06-06 PROCEDURE — 99283 EMERGENCY DEPT VISIT LOW MDM: CPT

## 2025-06-06 RX ORDER — BUTALBITAL, ACETAMINOPHEN, CAFFEINE AND CODEINE PHOSPHATE 300; 50; 40; 30 MG/1; MG/1; MG/1; MG/1
1 CAPSULE ORAL EVERY 4 HOURS PRN
Qty: 10 CAPSULE | Refills: 0 | Status: SHIPPED | OUTPATIENT
Start: 2025-06-06 | End: 2025-06-13

## 2025-06-06 ASSESSMENT — PAIN SCALES - GENERAL: PAINLEVEL_OUTOF10: 4

## 2025-06-06 ASSESSMENT — PAIN DESCRIPTION - LOCATION: LOCATION: HEAD

## 2025-06-06 ASSESSMENT — PAIN - FUNCTIONAL ASSESSMENT: PAIN_FUNCTIONAL_ASSESSMENT: 0-10

## 2025-06-06 NOTE — ED TRIAGE NOTES
Pt arrives in the ED after hitting head on shelf Tuesday. Pt endorses dizziness and slight headache. Does not take blood thinners.

## 2025-06-06 NOTE — TELEPHONE ENCOUNTER
Per Dr. Alcantar- needs to be evaluated in the ED to poss get scans done.     Pt states that he may got to the ED. I did send him a link or making an appt with the NP if he decided not to.    Informed that he will need to be evaluate before a imaging order will poss be ordered.

## 2025-06-06 NOTE — TELEPHONE ENCOUNTER
Pt states he hit his head on stair well and had  headache and disorientated, this happened on 6-2-25. Pt wants to know if he should do anything? X Ray, MRI or Cat Scan    [phone]  Pls call and advise

## 2025-06-06 NOTE — ED PROVIDER NOTES
[facility]  EMERGENCY DEPARTMENT ENCOUNTER          ATTENDING PHYSICIAN NOTE       Date of evaluation: 6/6/2025    Chief Complaint     Head Injury      History of Present Illness     John Martin is a 24 y.o. male who presents to the emergency department on referral from his PCP nurses line for evaluation of symptoms related to a head injury.  Monday night, 4 days ago, he states he was working in his basement and stood up and struck his right occipital head by accident.  He did not lose consciousness.  He did have a mild headache that went away without medications.  Since that time, he has felt \"not quite right\" including a feeling of dizziness, confusion, and fogginess.  He initially had some photosensitivity, but that has resolved.  His symptoms were improving, but then worsened over the past couple of days.  However, this is in the context of working 2 overnight shifts.    He has not been using any medications for any of his symptoms.  He has had no nausea or vomiting, no reported seizures.  He does not take blood thinners.  He believes he may have had concussions in the past, but has not had to seek medical care.      Review of Systems     Pertinent positives and negatives as described in the history of present illness.    Past Medical, Surgical, Family, and Social History     He has a past medical history of Dizziness, Seasonal allergic rhinitis due to pollen, and Tinnitus.  He has no past surgical history on file.  His family history includes No Known Problems in his father and mother; Stroke in his paternal grandmother.  He reports that he has never smoked. He has never been exposed to tobacco smoke. He has never used smokeless tobacco. He reports that he does not drink alcohol and does not use drugs.    Medications     Previous Medications    FEXOFENADINE HCL (ALLEGRA ALLERGY PO)    Take by mouth       Allergies     He has no known allergies.    Physical Exam     INITIAL VITALS: /80    the night of the injury, he would not have required imaging based on clinical decision rules.    I suspect that his symptoms may have gotten worse over the past couple of days because of having to work and not being able to practice cognitive rest.    We will treat him symptomatically, and have encouraged him to continue to follow-up with his primary care physician.  I have also given him the number for sports medicine for longer-term follow-up.      Medical Decision Making  Problems Addressed:  Concussion without loss of consciousness, initial encounter: acute illness or injury    Risk  Prescription drug management.        Clinical Impression     1. Concussion without loss of consciousness, initial encounter        Disposition     PATIENT REFERRED TO:  [facility] Emergency Department  [address] [id number] [phone]    As needed    Nedra Alcantar MD  [address]  [address]  [facility] [id number] [phone]      For re-evaluation, follow-up, and discuss ED visit    [facility]  [address] [address]  Schedule an appointment as soon as possible for a visit   For re-evaluation, follow-up, and discuss ED visit      DISCHARGE MEDICATIONS:  Discharge Medication List as of 6/6/2025  7:14 PM        START taking these medications    Details   butalbital-acetaminophen-caffeine-codeine (FIORICET WITH CODEINE) -61-30 MG per capsule Take 1 capsule by mouth every 4 hours as needed (concussion) for up to 7 days. Max Daily Amount: 6 capsules, Disp-10 capsule, R-0Normal             DISPOSITION Decision To Discharge 06/06/2025 07:09:27 PM   DISPOSITION CONDITION Stable               Jabier Reinoso MD  06/06/25 [id number]

## 2025-06-06 NOTE — DISCHARGE INSTRUCTIONS
As we discussed, rest your brain is much as possible and advance your activity slowly and purposefully.    See additional information attached.  Follow-up with your primary care physician, and have also given you the phone number for [facility] medicine for longer-term follow-up if needed.    Come back to the emergency department for vomiting, weakness, seizures, any other urgent concerns.